# Patient Record
Sex: MALE | Race: OTHER | URBAN - METROPOLITAN AREA
[De-identification: names, ages, dates, MRNs, and addresses within clinical notes are randomized per-mention and may not be internally consistent; named-entity substitution may affect disease eponyms.]

---

## 2017-06-27 ENCOUNTER — OUTPATIENT (OUTPATIENT)
Dept: OUTPATIENT SERVICES | Facility: HOSPITAL | Age: 52
LOS: 1 days | Discharge: HOME | End: 2017-06-27

## 2017-06-28 DIAGNOSIS — K02.52 DENTAL CARIES ON PIT AND FISSURE SURFACE PENETRATING INTO DENTIN: ICD-10-CM

## 2019-08-20 ENCOUNTER — EMERGENCY (EMERGENCY)
Facility: HOSPITAL | Age: 54
LOS: 0 days | Discharge: HOME | End: 2019-08-20
Admitting: EMERGENCY MEDICINE
Payer: SUBSIDIZED

## 2019-08-20 ENCOUNTER — APPOINTMENT (OUTPATIENT)
Dept: INTERNAL MEDICINE | Facility: CLINIC | Age: 54
End: 2019-08-20
Payer: COMMERCIAL

## 2019-08-20 ENCOUNTER — OUTPATIENT (OUTPATIENT)
Dept: OUTPATIENT SERVICES | Facility: HOSPITAL | Age: 54
LOS: 1 days | Discharge: HOME | End: 2019-08-20

## 2019-08-20 VITALS
WEIGHT: 215 LBS | DIASTOLIC BLOOD PRESSURE: 81 MMHG | BODY MASS INDEX: 34.55 KG/M2 | HEIGHT: 66 IN | HEART RATE: 81 BPM | TEMPERATURE: 97.8 F | SYSTOLIC BLOOD PRESSURE: 130 MMHG

## 2019-08-20 VITALS
DIASTOLIC BLOOD PRESSURE: 69 MMHG | TEMPERATURE: 98 F | SYSTOLIC BLOOD PRESSURE: 136 MMHG | HEART RATE: 78 BPM | RESPIRATION RATE: 18 BRPM | OXYGEN SATURATION: 98 %

## 2019-08-20 DIAGNOSIS — M79.603 PAIN IN ARM, UNSPECIFIED: ICD-10-CM

## 2019-08-20 DIAGNOSIS — M79.641 PAIN IN RIGHT HAND: ICD-10-CM

## 2019-08-20 DIAGNOSIS — M79.642 PAIN IN LEFT HAND: ICD-10-CM

## 2019-08-20 PROCEDURE — 99283 EMERGENCY DEPT VISIT LOW MDM: CPT

## 2019-08-20 PROCEDURE — 99213 OFFICE O/P EST LOW 20 MIN: CPT | Mod: GC

## 2019-08-20 NOTE — ED PROVIDER NOTE - CLINICAL SUMMARY MEDICAL DECISION MAKING FREE TEXT BOX
Pt with bilateral hand pain x 3 months. States pain is throbbing and associated with numbness that is worse at night. 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 No trauma. No weakness. No decreased rom, joint swelling, redness, warmth, fever or chills. Patient given hand surgery to follow up with. Discussed strict return precautions. I have discussed the discharge plan with the patient. The patient agrees with the plan, as discussed.  The patient understands Emergency Department diagnosis is a preliminary diagnosis often based on limited information and that the patient must adhere to the follow-up plan as discussed.  The patient understands that if the symptoms worsen or if prescribed medications do not have the desired/planned effect that the patient may return to the Emergency Department at any time for further evaluation and treatment.

## 2019-08-20 NOTE — ED PROVIDER NOTE - NS ED ROS FT
Review of Systems:  	•	CONSTITUTIONAL - no fever, no diaphoresis, no chills  	•	SKIN - no rash  	•	HEMATOLOGIC - no bleeding, no bruising  	•	ENT - no congestion  	•	RESPIRATORY - no shortness of breath, no cough  	•	CARDIAC - no chest pain, no palpitations  	•	MUSCULOSKELETAL - +hand pain, no swelling, no redness  	•	NEUROLOGIC - no weakness, no headache, +paresthesias, no LOC  	All other ROS are negative except as documented in HPI.

## 2019-08-20 NOTE — ED ADULT NURSE NOTE - OBJECTIVE STATEMENT
pt with left hand pain. pt reports pain x 3 months shooting to hand. no edformity or swelling noted.

## 2019-08-20 NOTE — ED PROVIDER NOTE - CARE PROVIDER_API CALL
Som Nevarez)  Orthopaedic Surgery  [address] [place] [address]  Phone: [phone]  Fax: [phone]  Follow Up Time: 1-3 Days

## 2019-08-20 NOTE — ED PROVIDER NOTE - PHYSICAL EXAMINATION
VITAL SIGNS: I have reviewed nursing notes and confirm.  CONSTITUTIONAL: Well-developed; well-nourished; in no acute distress.  SKIN: Skin exam is warm and dry, no acute rash.  HEAD: Normocephalic; atraumatic.  EYES: PERRL, EOM intact; conjunctiva and sclera clear.  ENT: No nasal discharge; airway clear.   NECK: Supple; non tender.  CARD: S1, S2 normal; no murmurs, gallops, or rubs. Regular rate and rhythm.  RESP: Clear to auscultation bilaterally. No wheezes, rales or rhonchi.  EXT: Normal ROM. No edema.  NEURO: Alert, oriented. Grossly unremarkable. No focal deficits.  PSYCH: Cooperative, appropriate.

## 2019-08-20 NOTE — PHYSICAL EXAM
[Normal Sclera/Conjunctiva] : normal sclera/conjunctiva [No Acute Distress] : no acute distress [No JVD] : no jugular venous distention [Normal Outer Ear/Nose] : the outer ears and nose were normal in appearance [No Respiratory Distress] : no respiratory distress  [No Edema] : there was no peripheral edema [Normal Rate] : normal rate

## 2019-08-20 NOTE — ED PROVIDER NOTE - NSFOLLOWUPINSTRUCTIONS_ED_ALL_ED_FT
Carpal Tunnel Syndrome  Image   Carpal tunnel syndrome is a condition that causes pain in your hand and arm. The carpal tunnel is a narrow area located on the palm side of your wrist. Repeated wrist motion or certain diseases may cause swelling within the tunnel. This swelling pinches the main nerve in the wrist (median nerve).    What are the causes?  This condition may be caused by:  Repeated wrist motions.  Wrist injuries.  Arthritis.  A cyst or tumor in the carpal tunnel.  Fluid buildup during pregnancy.  Sometimes the cause of this condition is not known.    What increases the risk?  This condition is more likely to develop in:  People who have jobs that cause them to repeatedly move their wrists in the same motion, such as butchers and cashiers.  Women.  People with certain conditions, such as:  Diabetes.  Obesity.  An underactive thyroid (hypothyroidism).  Kidney failure.  What are the signs or symptoms?  Symptoms of this condition include:  A tingling feeling in your fingers, especially in your thumb, index, and middle fingers.  Tingling or numbness in your hand.  An aching feeling in your entire arm, especially when your wrist and elbow are bent for long periods of time.  Wrist pain that goes up your arm to your shoulder.  Pain that goes down into your palm or fingers.  A weak feeling in your hands. You may have trouble grabbing and holding items.  Your symptoms may feel worse during the night.    How is this diagnosed?  This condition is diagnosed with a medical history and physical exam. You may also have tests, including:  An electromyogram (EMG). This test measures electrical signals sent by your nerves into the muscles.  X-rays.  How is this treated?  Treatment for this condition includes:  Lifestyle changes. It is important to stop doing or modify the activity that caused your condition.  Physical or occupational therapy.  Medicines for pain and inflammation. This may include medicine that is injected into your wrist.  A wrist splint.  Surgery.  Follow these instructions at home:  If you have a splint:     Wear it as told by your health care provider. Remove it only as told by your health care provider.  Loosen the splint if your fingers become numb and tingle, or if they turn cold and blue.  Keep the splint clean and dry.  General instructions     Image   Take over-the-counter and prescription medicines only as told by your health care provider.  Rest your wrist from any activity that may be causing your pain. If your condition is work related, talk to your employer about changes that can be made, such as getting a wrist pad to use while typing.  If directed, apply ice to the painful area:  Put ice in a plastic bag.  Place a towel between your skin and the bag.  Leave the ice on for 20 minutes, 2–3 times per day.  Keep all follow-up visits as told by your health care provider. This is important.  Do any exercises as told by your health care provider, physical therapist, or occupational therapist.  Contact a health care provider if:  You have new symptoms.  Your pain is not controlled with medicines.  Your symptoms get worse.  This information is not intended to replace advice given to you by your health care provider. Make sure you discuss any questions you have with your health care provider.

## 2019-08-20 NOTE — ED PROVIDER NOTE - OBJECTIVE STATEMENT
53 yo M with no pmhx presenting with bilateral hand pain x 3 months. States pain is throbbing and associated with numbness that is worse at night. 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 No trauma. No weakness. No decreased rom, joint swelling, redness, warmth, fever or chills. No cp or sob. States had similar issue in the past but had something injected that helped with pain.

## 2019-08-20 NOTE — ED PROVIDER NOTE - NSFOLLOWUPCLINICS_GEN_ALL_ED_FT
[facility]  Orthpedic  [address], [place]   Phone: [phone]  Fax:   Follow Up Time: 1-3 Days

## 2019-08-22 NOTE — HISTORY OF PRESENT ILLNESS
[FreeTextEntry1] : Pt is a 55 yo M who comes to the clinic for left hand pain.  [de-identified] : Pt is a 55 Yo M who comes to the clinic for left hand pain. pt has a h/o left hand carpel tunnel syndrome for which he got injection in his  left hand about 10 years ago. \par now pt complains of left hand pain since 2 months. went to the ED today- was dz with carpal tunnel and referred to ortho.

## 2019-08-22 NOTE — ASSESSMENT
[FreeTextEntry1] : Pt is a 53 Yo M who comes to the clinic for left hand pain. pt has a h/o left hand carpel tunnel syndrome for which he got injection in his  left hand about 10 years ago. \par now pt complains of left hand pain since 2 months. went to the ED today- was dz with carpal tunnel and referred to ortho.\par \par #) Left hand carpel tunnel syndrome\par - Neurology referral for possible steroid injection. \par - will start on gabapentin \par \par \par \par #) HCM\par - cbc, cmp, TSH, hba1c, lipid \par - rtc in 3 months.

## 2019-09-16 ENCOUNTER — INPATIENT (INPATIENT)
Facility: HOSPITAL | Age: 54
LOS: 1 days | Discharge: HOME | End: 2019-09-18
Attending: SURGERY | Admitting: SURGERY
Payer: MEDICAID

## 2019-09-16 VITALS
RESPIRATION RATE: 20 BRPM | SYSTOLIC BLOOD PRESSURE: 147 MMHG | TEMPERATURE: 96 F | HEART RATE: 73 BPM | DIASTOLIC BLOOD PRESSURE: 91 MMHG | OXYGEN SATURATION: 100 % | WEIGHT: 220.02 LBS

## 2019-09-16 LAB
ALBUMIN SERPL ELPH-MCNC: 4.2 G/DL — SIGNIFICANT CHANGE UP (ref 3.5–5.2)
ALP SERPL-CCNC: 72 U/L — SIGNIFICANT CHANGE UP (ref 30–115)
ALT FLD-CCNC: 29 U/L — SIGNIFICANT CHANGE UP (ref 0–41)
ANION GAP SERPL CALC-SCNC: 10 MMOL/L — SIGNIFICANT CHANGE UP (ref 7–14)
APPEARANCE UR: CLEAR — SIGNIFICANT CHANGE UP
APTT BLD: 27.8 SEC — SIGNIFICANT CHANGE UP (ref 27–39.2)
AST SERPL-CCNC: 31 U/L — SIGNIFICANT CHANGE UP (ref 0–41)
BACTERIA # UR AUTO: NEGATIVE — SIGNIFICANT CHANGE UP
BASOPHILS # BLD AUTO: 0.04 K/UL — SIGNIFICANT CHANGE UP (ref 0–0.2)
BASOPHILS NFR BLD AUTO: 0.5 % — SIGNIFICANT CHANGE UP (ref 0–1)
BILIRUB SERPL-MCNC: 0.6 MG/DL — SIGNIFICANT CHANGE UP (ref 0.2–1.2)
BILIRUB UR-MCNC: NEGATIVE — SIGNIFICANT CHANGE UP
BUN SERPL-MCNC: 15 MG/DL — SIGNIFICANT CHANGE UP (ref 10–20)
CALCIUM SERPL-MCNC: 8.6 MG/DL — SIGNIFICANT CHANGE UP (ref 8.5–10.1)
CHLORIDE SERPL-SCNC: 105 MMOL/L — SIGNIFICANT CHANGE UP (ref 98–110)
CO2 SERPL-SCNC: 25 MMOL/L — SIGNIFICANT CHANGE UP (ref 17–32)
COLOR SPEC: YELLOW — SIGNIFICANT CHANGE UP
CREAT SERPL-MCNC: 0.7 MG/DL — SIGNIFICANT CHANGE UP (ref 0.7–1.5)
DIFF PNL FLD: ABNORMAL
EOSINOPHIL # BLD AUTO: 0.13 K/UL — SIGNIFICANT CHANGE UP (ref 0–0.7)
EOSINOPHIL NFR BLD AUTO: 1.5 % — SIGNIFICANT CHANGE UP (ref 0–8)
EPI CELLS # UR: 0 /HPF — SIGNIFICANT CHANGE UP (ref 0–5)
ETHANOL SERPL-MCNC: <10 MG/DL — SIGNIFICANT CHANGE UP
GLUCOSE SERPL-MCNC: 137 MG/DL — HIGH (ref 70–99)
GLUCOSE UR QL: NEGATIVE — SIGNIFICANT CHANGE UP
HCT VFR BLD CALC: 40.7 % — LOW (ref 42–52)
HGB BLD-MCNC: 13.6 G/DL — LOW (ref 14–18)
HYALINE CASTS # UR AUTO: 1 /LPF — SIGNIFICANT CHANGE UP (ref 0–7)
IMM GRANULOCYTES NFR BLD AUTO: 0.4 % — HIGH (ref 0.1–0.3)
INR BLD: 1.04 RATIO — SIGNIFICANT CHANGE UP (ref 0.65–1.3)
KETONES UR-MCNC: NEGATIVE — SIGNIFICANT CHANGE UP
LACTATE SERPL-SCNC: 1 MMOL/L — SIGNIFICANT CHANGE UP (ref 0.5–2.2)
LEUKOCYTE ESTERASE UR-ACNC: NEGATIVE — SIGNIFICANT CHANGE UP
LIDOCAIN IGE QN: 23 U/L — SIGNIFICANT CHANGE UP (ref 7–60)
LYMPHOCYTES # BLD AUTO: 1.24 K/UL — SIGNIFICANT CHANGE UP (ref 1.2–3.4)
LYMPHOCYTES # BLD AUTO: 14.5 % — LOW (ref 20.5–51.1)
MCHC RBC-ENTMCNC: 32.9 PG — HIGH (ref 27–31)
MCHC RBC-ENTMCNC: 33.4 G/DL — SIGNIFICANT CHANGE UP (ref 32–37)
MCV RBC AUTO: 98.3 FL — HIGH (ref 80–94)
MONOCYTES # BLD AUTO: 0.72 K/UL — HIGH (ref 0.1–0.6)
MONOCYTES NFR BLD AUTO: 8.4 % — SIGNIFICANT CHANGE UP (ref 1.7–9.3)
NEUTROPHILS # BLD AUTO: 6.41 K/UL — SIGNIFICANT CHANGE UP (ref 1.4–6.5)
NEUTROPHILS NFR BLD AUTO: 74.7 % — SIGNIFICANT CHANGE UP (ref 42.2–75.2)
NITRITE UR-MCNC: NEGATIVE — SIGNIFICANT CHANGE UP
NRBC # BLD: 0 /100 WBCS — SIGNIFICANT CHANGE UP (ref 0–0)
PH UR: 7.5 — SIGNIFICANT CHANGE UP (ref 5–8)
PLATELET # BLD AUTO: 168 K/UL — SIGNIFICANT CHANGE UP (ref 130–400)
POTASSIUM SERPL-MCNC: 4 MMOL/L — SIGNIFICANT CHANGE UP (ref 3.5–5)
POTASSIUM SERPL-SCNC: 4 MMOL/L — SIGNIFICANT CHANGE UP (ref 3.5–5)
PROT SERPL-MCNC: 6.7 G/DL — SIGNIFICANT CHANGE UP (ref 6–8)
PROT UR-MCNC: ABNORMAL
PROTHROM AB SERPL-ACNC: 11.9 SEC — SIGNIFICANT CHANGE UP (ref 9.95–12.87)
RBC # BLD: 4.14 M/UL — LOW (ref 4.7–6.1)
RBC # FLD: 13 % — SIGNIFICANT CHANGE UP (ref 11.5–14.5)
RBC CASTS # UR COMP ASSIST: 9 /HPF — HIGH (ref 0–4)
SODIUM SERPL-SCNC: 140 MMOL/L — SIGNIFICANT CHANGE UP (ref 135–146)
SP GR SPEC: >1.05 (ref 1.01–1.02)
TROPONIN T SERPL-MCNC: <0.01 NG/ML — SIGNIFICANT CHANGE UP
UROBILINOGEN FLD QL: SIGNIFICANT CHANGE UP
WBC # BLD: 8.57 K/UL — SIGNIFICANT CHANGE UP (ref 4.8–10.8)
WBC # FLD AUTO: 8.57 K/UL — SIGNIFICANT CHANGE UP (ref 4.8–10.8)
WBC UR QL: 0 /HPF — SIGNIFICANT CHANGE UP (ref 0–5)

## 2019-09-16 PROCEDURE — 72125 CT NECK SPINE W/O DYE: CPT | Mod: 26

## 2019-09-16 PROCEDURE — 71260 CT THORAX DX C+: CPT | Mod: 26

## 2019-09-16 PROCEDURE — 70450 CT HEAD/BRAIN W/O DYE: CPT | Mod: 26

## 2019-09-16 PROCEDURE — 99223 1ST HOSP IP/OBS HIGH 75: CPT

## 2019-09-16 PROCEDURE — 99285 EMERGENCY DEPT VISIT HI MDM: CPT

## 2019-09-16 PROCEDURE — 99222 1ST HOSP IP/OBS MODERATE 55: CPT

## 2019-09-16 PROCEDURE — 93010 ELECTROCARDIOGRAM REPORT: CPT

## 2019-09-16 PROCEDURE — 72146 MRI CHEST SPINE W/O DYE: CPT | Mod: 26

## 2019-09-16 PROCEDURE — 72170 X-RAY EXAM OF PELVIS: CPT | Mod: 26

## 2019-09-16 PROCEDURE — 72148 MRI LUMBAR SPINE W/O DYE: CPT | Mod: 26

## 2019-09-16 PROCEDURE — 71045 X-RAY EXAM CHEST 1 VIEW: CPT | Mod: 26

## 2019-09-16 PROCEDURE — 74177 CT ABD & PELVIS W/CONTRAST: CPT | Mod: 26

## 2019-09-16 RX ORDER — MORPHINE SULFATE 50 MG/1
4 CAPSULE, EXTENDED RELEASE ORAL ONCE
Refills: 0 | Status: DISCONTINUED | OUTPATIENT
Start: 2019-09-16 | End: 2019-09-16

## 2019-09-16 RX ORDER — OXYCODONE HYDROCHLORIDE 5 MG/1
5 TABLET ORAL EVERY 4 HOURS
Refills: 0 | Status: DISCONTINUED | OUTPATIENT
Start: 2019-09-16 | End: 2019-09-18

## 2019-09-16 RX ORDER — INFLUENZA VIRUS VACCINE 15; 15; 15; 15 UG/.5ML; UG/.5ML; UG/.5ML; UG/.5ML
0.5 SUSPENSION INTRAMUSCULAR ONCE
Refills: 0 | Status: DISCONTINUED | OUTPATIENT
Start: 2019-09-16 | End: 2019-09-18

## 2019-09-16 RX ORDER — ACETAMINOPHEN 500 MG
650 TABLET ORAL EVERY 6 HOURS
Refills: 0 | Status: DISCONTINUED | OUTPATIENT
Start: 2019-09-16 | End: 2019-09-18

## 2019-09-16 RX ORDER — MORPHINE SULFATE 50 MG/1
2 CAPSULE, EXTENDED RELEASE ORAL EVERY 4 HOURS
Refills: 0 | Status: DISCONTINUED | OUTPATIENT
Start: 2019-09-16 | End: 2019-09-18

## 2019-09-16 RX ORDER — HEPARIN SODIUM 5000 [USP'U]/ML
5000 INJECTION INTRAVENOUS; SUBCUTANEOUS EVERY 8 HOURS
Refills: 0 | Status: DISCONTINUED | OUTPATIENT
Start: 2019-09-16 | End: 2019-09-18

## 2019-09-16 RX ORDER — CHLORHEXIDINE GLUCONATE 213 G/1000ML
1 SOLUTION TOPICAL
Refills: 0 | Status: DISCONTINUED | OUTPATIENT
Start: 2019-09-16 | End: 2019-09-18

## 2019-09-16 RX ORDER — PANTOPRAZOLE SODIUM 20 MG/1
40 TABLET, DELAYED RELEASE ORAL
Refills: 0 | Status: DISCONTINUED | OUTPATIENT
Start: 2019-09-16 | End: 2019-09-18

## 2019-09-16 RX ORDER — SODIUM CHLORIDE 9 MG/ML
1000 INJECTION INTRAMUSCULAR; INTRAVENOUS; SUBCUTANEOUS ONCE
Refills: 0 | Status: COMPLETED | OUTPATIENT
Start: 2019-09-16 | End: 2019-09-16

## 2019-09-16 RX ORDER — KETOROLAC TROMETHAMINE 30 MG/ML
30 SYRINGE (ML) INJECTION EVERY 6 HOURS
Refills: 0 | Status: DISCONTINUED | OUTPATIENT
Start: 2019-09-16 | End: 2019-09-18

## 2019-09-16 RX ADMIN — Medication 30 MILLIGRAM(S): at 13:44

## 2019-09-16 RX ADMIN — Medication 650 MILLIGRAM(S): at 18:35

## 2019-09-16 RX ADMIN — Medication 30 MILLIGRAM(S): at 18:35

## 2019-09-16 RX ADMIN — MORPHINE SULFATE 2 MILLIGRAM(S): 50 CAPSULE, EXTENDED RELEASE ORAL at 20:19

## 2019-09-16 RX ADMIN — MORPHINE SULFATE 2 MILLIGRAM(S): 50 CAPSULE, EXTENDED RELEASE ORAL at 20:50

## 2019-09-16 RX ADMIN — SODIUM CHLORIDE 1000 MILLILITER(S): 9 INJECTION INTRAMUSCULAR; INTRAVENOUS; SUBCUTANEOUS at 08:34

## 2019-09-16 RX ADMIN — Medication 650 MILLIGRAM(S): at 13:43

## 2019-09-16 RX ADMIN — HEPARIN SODIUM 5000 UNIT(S): 5000 INJECTION INTRAVENOUS; SUBCUTANEOUS at 21:34

## 2019-09-16 RX ADMIN — MORPHINE SULFATE 4 MILLIGRAM(S): 50 CAPSULE, EXTENDED RELEASE ORAL at 09:33

## 2019-09-16 RX ADMIN — Medication 650 MILLIGRAM(S): at 18:39

## 2019-09-16 RX ADMIN — PANTOPRAZOLE SODIUM 40 MILLIGRAM(S): 20 TABLET, DELAYED RELEASE ORAL at 18:35

## 2019-09-16 RX ADMIN — Medication 30 MILLIGRAM(S): at 18:39

## 2019-09-16 NOTE — SBIRT NOTE ADULT - NSSBIRTBRIEFINTDET_GEN_A_CORE
Provided SBIRT services: Full screen positive. Referral to Treatment attempted. Screening results were reviewed with the patient and patient was provided information about healthy guidelines and potential negative consequences associated with level of risk. Motivation and readiness to reduce or stop use was discussed and goals and activities to make changes were suggested/offered. Options discussed for further evaluation and treatment, but referral to treatment was not completed because patient requires medical care and patient refused.

## 2019-09-16 NOTE — ED PROVIDER NOTE - PROGRESS NOTE DETAILS
pt signed out to me by Dr. Bush. Follow up CT scans, trauma and neurosurgical consultations, reassess and dispo. attempted to call [facility] will put an official consult. Spoke w/ neurosurgery will come and evaluate pt. Discussed with Dr. Shannon, trauma. Admit to Dr. Spear surgical floor.

## 2019-09-16 NOTE — PATIENT PROFILE ADULT - HAS THE PATIENT EXPERIENCED ANY OF THE FOLLOWING WITHIN THE WEEK PRIOR TO ADMISSION?
The supervising physician for this infusion visit is Cristal Ealm MD.  Patient arrived ambulatory using walker. Patient here with family member  
no

## 2019-09-16 NOTE — H&P ADULT - NSHPPHYSICALEXAM_GEN_ALL_CORE
PHYSICAL EXAM:  General: NAD, AAOx3, calm and cooperative  HEENT: NCAT, MARIA DEL CARMEN, EOMI, Trachea ML, Neck supple  Cardiac: RRR S1, S2, no Murmurs, rubs or gallops  Respiratory: CTAB, normal respiratory effort, breath sounds equal BL, no wheeze, rhonchi or crackles  Abdomen: Soft, non-distended, + point tenderness xyphoid, no rebound, no guarding. +BS.  Back:  + Mid back tenderness, no stepoff.   Extremities: UE: bilateral- shoulder/biceps/triceps/wrist/finger strength intact 5/5; sensory intact; no olvera  LE bilateral hip flex/leg ext/DF/PF strength intact 5/5; sensory intact ; reflexes

## 2019-09-16 NOTE — CONSULT NOTE ADULT - SUBJECTIVE AND OBJECTIVE BOX
SUSHMA GONZALEZ  [id number]      Current Issues:    HPI:       PAST MEDICAL & SURGICAL HISTORY:  No pertinent past medical history  No significant past surgical history      FAMILY HISTORY:      Allergies    No Known Allergies    Intolerances        Home Medications:      MEDICATIONS:  Antibiotics:    Neuro:    Anticoagulation:    OTHER:    IVF:      Vital Signs Last 24 Hrs  T(C): 35.7 (16 Sep 2019 07:04), Max: 35.7 (16 Sep 2019 07:04)  T(F): 96.3 (16 Sep 2019 07:04), Max: 96.3 (16 Sep 2019 07:04)  HR: 73 (16 Sep 2019 07:04) (73 - 73)  BP: 147/91 (16 Sep 2019 07:04) (147/91 - 147/91)  RR: 20 (16 Sep 2019 07:04) (20 - 20)  SpO2: 100% (16 Sep 2019 07:04) (100% - 100%)    PHYSICAL EXAM:  Constitutional:  Eyes:  face:  Back:  motors:    LABS:                        13.6   8.57  )-----------( 168      ( 16 Sep 2019 07:26 )             40.7     09-16    140  |  105  |  15  ----------------------------<  137<H>  4.0   |  25  |  0.7    Ca    8.6      16 Sep 2019 07:26    TPro  6.7  /  Alb  4.2  /  TBili  0.6  /  DBili  x   /  AST  31  /  ALT  29  /  AlkPhos  72  09-16    PT/INR - ( 16 Sep 2019 07:26 )   PT: 11.90 sec;   INR: 1.04 ratio         PTT - ( 16 Sep 2019 07:26 )  PTT:27.8 sec        RADIOLOGY & ADDITIONAL STUDIES:    < from: CT Abdomen and Pelvis w/ IV Cont (09.16.19 @ 08:16) >  IMPRESSION:  Chance-type  T11-T12 fractures -compatible with unstable injury. Further   evaluation with MRI and neurosurgical evaluation recommended.  < end of copied text >    < from: CT Cervical Spine No Cont (09.16.19 @ 08:09) >  IMPRESSION:  1.  No acute fracture demonstrated.    2.  Mild degenerative changes.  < end of copied text >    < from: CT Head No Cont (09.16.19 @ 08:07) >    IMPRESSION:    No evidence of acute intracranial hemorrhage.    < end of copied text >    Plan:              . USSHMASAMUEL GONZALEZ  [id number]      Current Issues:    Patient c/o mid lower back pain with chest/Upper abdominal pain.  worse with movements and walking.  no neck pain.  no ha.  no n, v.  no arm or leg radicular pain.  no weakness or numbness reported.  no fecal or urine incontinence  s/p fall Friday.      HPI:   54 y m no pmh pw fall. Fall on Friday while intoxicated. States he fell off of an 8 foot porch and a friend told him he landed on his stomach. Seen at [facility] but left before evaluation. Since then has been having lower back pain and epigastric pain. Denies daily etoh use. Denies blood thinner use. Denies ha, n/v, cp, sob, vision change, neck pain, extremity pain.      PAST MEDICAL & SURGICAL HISTORY:  No pertinent past medical history  No significant past surgical history      Allergies    No Known Allergies    Intolerances        Home Medications:      MEDICATIONS:  Antibiotics:    Neuro:    Anticoagulation:    OTHER:    IVF:      Vital Signs Last 24 Hrs  T(C): 35.7 (16 Sep 2019 07:04), Max: 35.7 (16 Sep 2019 07:04)  T(F): 96.3 (16 Sep 2019 07:04), Max: 96.3 (16 Sep 2019 07:04)  HR: 73 (16 Sep 2019 07:04) (73 - 73)  BP: 147/91 (16 Sep 2019 07:04) (147/91 - 147/91)  RR: 20 (16 Sep 2019 07:04) (20 - 20)  SpO2: 100% (16 Sep 2019 07:04) (100% - 100%)    PHYSICAL EXAM:  Constitutional: alert and Ox3   Eyes:  PERRLA EOMI  face:  symmtrical  Back: tender to mid back on rolling and palpation  abd: xiphoid tenderness to palp; no sensory deficits  extremities: Uppers bilateral- shoulder/biceps/triceps/wrist/finger strength intact 5/5; sensory intact; no olvera                    Lowers bilateral hip flex/leg ext/DF/PF strength intact 5/5; sensory intact ; reflexes 2+    LABS:                        13.6   8.57  )-----------( 168      ( 16 Sep 2019 07:26 )             40.7     09-16    140  |  105  |  15  ----------------------------<  137<H>  4.0   |  25  |  0.7    Ca    8.6      16 Sep 2019 07:26    TPro  6.7  /  Alb  4.2  /  TBili  0.6  /  DBili  x   /  AST  31  /  ALT  29  /  AlkPhos  72  09-16    PT/INR - ( 16 Sep 2019 07:26 )   PT: 11.90 sec;   INR: 1.04 ratio         PTT - ( 16 Sep 2019 07:26 )  PTT:27.8 sec        RADIOLOGY & ADDITIONAL STUDIES:    < from: CT Abdomen and Pelvis w/ IV Cont (09.16.19 @ 08:16) >  IMPRESSION:  Chance-type  T11-T12 fractures -compatible with unstable injury. Further   evaluation with MRI and neurosurgical evaluation recommended.  < end of copied text >    < from: CT Cervical Spine No Cont (09.16.19 @ 08:09) >  IMPRESSION:  1.  No acute fracture demonstrated.    2.  Mild degenerative changes.  < end of copied text >    < from: CT Head No Cont (09.16.19 @ 08:07) >    IMPRESSION:    No evidence of acute intracranial hemorrhage.    < end of copied text >    Plan:    strict bedrest  TLSO brace   MRI T-LS spine without contrast  Discussed with Attending Neurosurgeon the  patient status and agreed with the current plan.           .

## 2019-09-16 NOTE — ED ADULT NURSE NOTE - NSIMPLEMENTINTERV_GEN_ALL_ED
Implemented All Fall Risk Interventions:  [place] to call system. Call bell, personal items and telephone within reach. Instruct patient to call for assistance. Room bathroom lighting operational. Non-slip footwear when patient is off stretcher. Physically safe environment: no spills, clutter or unnecessary equipment. Stretcher in lowest position, wheels locked, appropriate side rails in place. Provide visual cue, wrist band, yellow gown, etc. Monitor gait and stability. Monitor for mental status changes and reorient to person, place, and time. Review medications for side effects contributing to fall risk. Reinforce activity limits and safety measures with patient and family.

## 2019-09-16 NOTE — CONSULT NOTE ADULT - ATTENDING COMMENTS
Pt seen and examined with PA. Pt s/p fall while intoxicated on friday, presents today to ER, ambulating, c/o of severe back pain. found to have Chance-type T11-T12 fractures -compatible with unstable injury. Pt with no LE complaints. BLE 5/5, B/B function intact. Recc bed rest for now. Obtain TLSO brace, MRI of the lumbar spine. Pending MRI results will determine if can manage fracture expectantly with TLSO brace or if patient will require fusion stabilization. Start [facility].

## 2019-09-16 NOTE — ED PROVIDER NOTE - PHYSICAL EXAMINATION
CONSTITUTIONAL: Well-developed; well-nourished; in no acute distress.   SKIN: warm, dry; no laceration, abrasion, ecchymoses.   HEAD: Normocephalic; atraumatic.  EYES: PERRL, EOMI, normal sclera and conjunctiva   ENT: No nasal discharge; airway clear.  NECK: Supple; non tender.  CARD: S1, S2 normal; no murmurs, gallops, or rubs. Regular rate and rhythm.   RESP: No wheezes, rales or rhonchi.  ABD: soft, nondistended. TTP over epigastrium to light palpation. No ecchymoses over abdominal wall.   EXT: Lumbar paraspinal back tenderness, worse L>R. No midline spinal tenderness. Ambulating and bearing weight. No point tenderness or deformity over extremities. Normal ROM.  No clubbing, cyanosis or edema.   LYMPH: No acute cervical adenopathy.  NEURO: Alert, oriented, grossly unremarkable. GCS 15. Moving all extremities with normal strength/sensation.  PSYCH: Cooperative, appropriate.

## 2019-09-16 NOTE — H&P ADULT - NSHPLABSRESULTS_GEN_ALL_CORE
LAB/STUDIES:             13.6   8.57  )-----------( 168      ( 16 Sep 2019 07:26 )             40.7     09-16    140  |  105  |  15  ----------------------------<  137<H>  4.0   |  25  |  0.7    Ca    8.6      16 Sep 2019 07:26    TPro  6.7  /  Alb  4.2  /  TBili  0.6  /  DBili  x   /  AST  31  /  ALT  29  /  AlkPhos  72  09-16    PT/INR - ( 16 Sep 2019 07:26 )   PT: 11.90 sec;   INR: 1.04 ratio      PTT - ( 16 Sep 2019 07:26 )  PTT:27.8 sec  LIVER FUNCTIONS - ( 16 Sep 2019 07:26 )  Alb: 4.2 g/dL / Pro: 6.7 g/dL / ALK PHOS: 72 U/L / ALT: 29 U/L / AST: 31 U/L / GGT: x           Urinalysis Basic - ( 16 Sep 2019 07:26 )    Color: Yellow / Appearance: Clear / SG: >1.050 / pH: x  Gluc: x / Ketone: Negative  / Bili: Negative / Urobili: <2 mg/dL   Blood: x / Protein: 30 mg/dL / Nitrite: Negative   Leuk Esterase: Negative / RBC: 9 /HPF / WBC 0 /HPF   Sq Epi: x / Non Sq Epi: 0 /HPF / Bacteria: Negative    CARDIAC MARKERS ( 16 Sep 2019 07:32 )  x     / <0.01 ng/mL / x     / x     / x        IMAGING:  CT Head No Cont (09.16.19 @ 08:07) >  IMPRESSION:  No evidence of acute intracranial hemorrhage.    CT Cervical Spine No Cont (09.16.19 @ 08:09) >  IMPRESSION:  1.  No acute fracture demonstrated.  2.  Mild degenerative change    CT Chest w/ IV Cont (09.16.19 @ 08:16) >  IMPRESSION:  Chance-type  T11-T12 fractures -compatible with unstable injury. Further evaluation with MRI and neurosurgical evaluation recommended.    CT Abdomen and Pelvis w/ IV Cont (09.16.19 @ 08:16) >  IMPRESSION:  Chance-type  T11-T12 fractures -compatible with unstable injury. Further evaluation with MRI and neurosurgical evaluation recommended.

## 2019-09-16 NOTE — CONSULT NOTE ADULT - CONSULT REQUESTED DATE/TIME
Rafael called to report that he is starting 1cup of Kale, and 2cups of spinach as he is having a hard time eating 2cups of kale a day. I thanked him for calling, and will update chart for records.    16-Sep-2019 09:40

## 2019-09-16 NOTE — ED ADULT TRIAGE NOTE - CHIEF COMPLAINT QUOTE
pt states he fell off his porch on Friday night while intoxicated (approx 8 feet)  pt now c.o. back pain and rib pain. pt denies LOC/anticoagulation/head trauma

## 2019-09-16 NOTE — ED PROVIDER NOTE - CARE PLAN
Principal Discharge DX:	Fall  Secondary Diagnosis:	T11 vertebral fracture  Secondary Diagnosis:	T12 vertebral fracture

## 2019-09-16 NOTE — H&P ADULT - ASSESSMENT
54M w/ no PMHx s/p fall on Friday 9/13 while intoxicated. Pt  fell off of an 8ft porch and a friend told him he landed on his stomach. Seen at [facility] but left before evaluation. Since then has been having lower back pain and epigastric pain. Trauma work up in ED with findings of abhinav - type  T11-T12 fractures -compatible with unstable injury. On Exam no neuro deficits. + Tspine tenderness.    PLAN:  - Evaluated by NSx recs: strict bedrest, TLSO brace , MRI T-LS spine without contrast.  - Regular diet, IVL  - Pain Control  - GI Prophylaxis w/ Protonix 40mg Daily   - Chemoprophylaxis w/ Heparin SubQ 5000u q8h.

## 2019-09-16 NOTE — H&P ADULT - HISTORY OF PRESENT ILLNESS
54M w/ no PMHx s/p fall on Friday 9/13 while intoxicated. Pt  fell off of an 8ft porch and a friend told him he landed on his stomach. Seen at [facility] but left before evaluation. Since then has been having lower back pain and epigastric pain. Denies daily Etoh use. Denies blood thinner use.

## 2019-09-16 NOTE — H&P ADULT - ATTENDING COMMENTS
54M w/ no PMHx s/p fall on Friday 9/13 while intoxicated. Pt  fell off of an 8ft porch and a friend told him he landed on his stomach. Seen at [facility] but left before evaluation. Since then has been having lower back pain and epigastric pain. Trauma work up in ED with findings of abhinav - type  T11-T12 fractures -compatible with unstable injury. On Exam no neuro deficits. + Tspine tenderness.    PLAN:  - Evaluated by NSx recs: strict bedrest, TLSO brace , MRI T-LS spine without contrast.  - Regular diet, IVL  - Pain Control  - GI Prophylaxis w/ Protonix 40mg Daily   - Chemoprophylaxis w/ Heparin SubQ 5000u q8h.

## 2019-09-16 NOTE — ED PROVIDER NOTE - NS ED ROS FT
Eyes:  No visual changes, eye pain or discharge.  ENMT:  No hearing changes, pain, discharge or infections. No neck pain or stiffness.  Cardiac:  No chest pain, SOB or edema. No chest pain with exertion.  Respiratory:  No cough or respiratory distress. No hemoptysis. No history of asthma or RAD.  GI:  +epigastric pain. No nausea, vomiting, diarrhea   :  No dysuria, frequency or burning.  MS: +back pain. No myalgia, muscle weakness, joint pain  Neuro:  No headache or weakness.  No LOC.  Skin:  No skin rash, laceration, abrasion, ecchymoses.  Endocrine: No history of thyroid disease or diabetes.

## 2019-09-16 NOTE — ED PROVIDER NOTE - ATTENDING CONTRIBUTION TO CARE
53 y/o male denies sig PMH / PSH presents with MSCP x 2 days. Pt states that he was drinking 2 days ago and fell from a height of approx 8 feet, he does not recall the event but it was witnessed by his friends, since that day he has had the MSCP, constant, radiates to his back, worse with certain movements position, deep breath, better with rest, denies head trauma, LOC, paresthesias or other complaints at present. He was not previously evaluated for this fall.    Old chart reviewed.  I have reviewed and agree with the nursing note, except as documented in my note.    VSS, awake, alert, non-toxic appearing, sitting comfortably on exam table, in NAD, Head NC / NT, PERRL / EOMI, no hemotympanum, no dental injury, no abrasions or lacerations, chest CTAB, slower sternum / left chest wall ttp w/o crepitus or flail, no w/r/r, +S1/S2, RRR, no m/r/g, abdomen soft, NT, ND, +BS, able to voluntarily actively rotate neck 45 degrees left and right on request, no midline spinal tenderness, no CVA tenderness, FROM x 4, no bony tenderness, alert and oriented to person, place and time, clear speech, cranial nerves II-XII are intact, upper and lower extremity strength is 5/5, symmetrical against gravity and external force, sensation is intact, coordination and gait are normal. GCS 15.

## 2019-09-16 NOTE — ED PROVIDER NOTE - CLINICAL SUMMARY MEDICAL DECISION MAKING FREE TEXT BOX
53 Y/O M S/P FALL FROM HEIGHT OF 8 FEET 2 DAYS PRIOR TO PRESENTATION. PT C/O BACK PAIN. ALL DIAGNOSTIC TESTING REVIEWED. CT SCANS WITH MULTIPLE LUMBAR SPINE FXS. TRAUMA CONSULTED. NEUROSURGERY CONSULTED. NEURO EXAM NONFOCAL. PT ADMITTED TO THE TRAUMA SERVICE.

## 2019-09-17 LAB
ANION GAP SERPL CALC-SCNC: 10 MMOL/L — SIGNIFICANT CHANGE UP (ref 7–14)
ANION GAP SERPL CALC-SCNC: 9 MMOL/L — SIGNIFICANT CHANGE UP (ref 7–14)
BASOPHILS # BLD AUTO: 0.03 K/UL — SIGNIFICANT CHANGE UP (ref 0–0.2)
BASOPHILS NFR BLD AUTO: 0.5 % — SIGNIFICANT CHANGE UP (ref 0–1)
BUN SERPL-MCNC: 14 MG/DL — SIGNIFICANT CHANGE UP (ref 10–20)
BUN SERPL-MCNC: 16 MG/DL — SIGNIFICANT CHANGE UP (ref 10–20)
CALCIUM SERPL-MCNC: 8.3 MG/DL — LOW (ref 8.5–10.1)
CALCIUM SERPL-MCNC: 8.8 MG/DL — SIGNIFICANT CHANGE UP (ref 8.5–10.1)
CHLORIDE SERPL-SCNC: 105 MMOL/L — SIGNIFICANT CHANGE UP (ref 98–110)
CHLORIDE SERPL-SCNC: 106 MMOL/L — SIGNIFICANT CHANGE UP (ref 98–110)
CO2 SERPL-SCNC: 23 MMOL/L — SIGNIFICANT CHANGE UP (ref 17–32)
CO2 SERPL-SCNC: 25 MMOL/L — SIGNIFICANT CHANGE UP (ref 17–32)
CREAT SERPL-MCNC: 0.6 MG/DL — LOW (ref 0.7–1.5)
CREAT SERPL-MCNC: 0.8 MG/DL — SIGNIFICANT CHANGE UP (ref 0.7–1.5)
EOSINOPHIL # BLD AUTO: 0.22 K/UL — SIGNIFICANT CHANGE UP (ref 0–0.7)
EOSINOPHIL NFR BLD AUTO: 3.3 % — SIGNIFICANT CHANGE UP (ref 0–8)
GLUCOSE SERPL-MCNC: 119 MG/DL — HIGH (ref 70–99)
GLUCOSE SERPL-MCNC: 126 MG/DL — HIGH (ref 70–99)
HCT VFR BLD CALC: 37.8 % — LOW (ref 42–52)
HCT VFR BLD CALC: 38.8 % — LOW (ref 42–52)
HCV AB S/CO SERPL IA: 0.18 S/CO — SIGNIFICANT CHANGE UP (ref 0–0.99)
HCV AB SERPL-IMP: SIGNIFICANT CHANGE UP
HGB BLD-MCNC: 12.5 G/DL — LOW (ref 14–18)
HGB BLD-MCNC: 13 G/DL — LOW (ref 14–18)
IMM GRANULOCYTES NFR BLD AUTO: 0.6 % — HIGH (ref 0.1–0.3)
LYMPHOCYTES # BLD AUTO: 1.93 K/UL — SIGNIFICANT CHANGE UP (ref 1.2–3.4)
LYMPHOCYTES # BLD AUTO: 29.1 % — SIGNIFICANT CHANGE UP (ref 20.5–51.1)
MAGNESIUM SERPL-MCNC: 2.1 MG/DL — SIGNIFICANT CHANGE UP (ref 1.8–2.4)
MAGNESIUM SERPL-MCNC: 2.2 MG/DL — SIGNIFICANT CHANGE UP (ref 1.8–2.4)
MCHC RBC-ENTMCNC: 32.3 PG — HIGH (ref 27–31)
MCHC RBC-ENTMCNC: 32.7 PG — HIGH (ref 27–31)
MCHC RBC-ENTMCNC: 33.1 G/DL — SIGNIFICANT CHANGE UP (ref 32–37)
MCHC RBC-ENTMCNC: 33.5 G/DL — SIGNIFICANT CHANGE UP (ref 32–37)
MCV RBC AUTO: 97.5 FL — HIGH (ref 80–94)
MCV RBC AUTO: 97.7 FL — HIGH (ref 80–94)
MONOCYTES # BLD AUTO: 0.52 K/UL — SIGNIFICANT CHANGE UP (ref 0.1–0.6)
MONOCYTES NFR BLD AUTO: 7.8 % — SIGNIFICANT CHANGE UP (ref 1.7–9.3)
NEUTROPHILS # BLD AUTO: 3.89 K/UL — SIGNIFICANT CHANGE UP (ref 1.4–6.5)
NEUTROPHILS NFR BLD AUTO: 58.7 % — SIGNIFICANT CHANGE UP (ref 42.2–75.2)
NRBC # BLD: 0 /100 WBCS — SIGNIFICANT CHANGE UP (ref 0–0)
NRBC # BLD: 0 /100 WBCS — SIGNIFICANT CHANGE UP (ref 0–0)
PHOSPHATE SERPL-MCNC: 3.5 MG/DL — SIGNIFICANT CHANGE UP (ref 2.1–4.9)
PHOSPHATE SERPL-MCNC: 3.7 MG/DL — SIGNIFICANT CHANGE UP (ref 2.1–4.9)
PLATELET # BLD AUTO: 152 K/UL — SIGNIFICANT CHANGE UP (ref 130–400)
PLATELET # BLD AUTO: 167 K/UL — SIGNIFICANT CHANGE UP (ref 130–400)
POTASSIUM SERPL-MCNC: 4.2 MMOL/L — SIGNIFICANT CHANGE UP (ref 3.5–5)
POTASSIUM SERPL-MCNC: 4.2 MMOL/L — SIGNIFICANT CHANGE UP (ref 3.5–5)
POTASSIUM SERPL-SCNC: 4.2 MMOL/L — SIGNIFICANT CHANGE UP (ref 3.5–5)
POTASSIUM SERPL-SCNC: 4.2 MMOL/L — SIGNIFICANT CHANGE UP (ref 3.5–5)
RBC # BLD: 3.87 M/UL — LOW (ref 4.7–6.1)
RBC # BLD: 3.98 M/UL — LOW (ref 4.7–6.1)
RBC # FLD: 12.8 % — SIGNIFICANT CHANGE UP (ref 11.5–14.5)
RBC # FLD: 12.8 % — SIGNIFICANT CHANGE UP (ref 11.5–14.5)
SODIUM SERPL-SCNC: 138 MMOL/L — SIGNIFICANT CHANGE UP (ref 135–146)
SODIUM SERPL-SCNC: 140 MMOL/L — SIGNIFICANT CHANGE UP (ref 135–146)
WBC # BLD: 5.74 K/UL — SIGNIFICANT CHANGE UP (ref 4.8–10.8)
WBC # BLD: 6.63 K/UL — SIGNIFICANT CHANGE UP (ref 4.8–10.8)
WBC # FLD AUTO: 5.74 K/UL — SIGNIFICANT CHANGE UP (ref 4.8–10.8)
WBC # FLD AUTO: 6.63 K/UL — SIGNIFICANT CHANGE UP (ref 4.8–10.8)

## 2019-09-17 PROCEDURE — 99232 SBSQ HOSP IP/OBS MODERATE 35: CPT

## 2019-09-17 RX ADMIN — Medication 650 MILLIGRAM(S): at 17:40

## 2019-09-17 RX ADMIN — Medication 30 MILLIGRAM(S): at 17:41

## 2019-09-17 RX ADMIN — Medication 650 MILLIGRAM(S): at 07:00

## 2019-09-17 RX ADMIN — Medication 650 MILLIGRAM(S): at 11:57

## 2019-09-17 RX ADMIN — Medication 30 MILLIGRAM(S): at 01:15

## 2019-09-17 RX ADMIN — OXYCODONE HYDROCHLORIDE 5 MILLIGRAM(S): 5 TABLET ORAL at 21:31

## 2019-09-17 RX ADMIN — Medication 30 MILLIGRAM(S): at 06:39

## 2019-09-17 RX ADMIN — Medication 650 MILLIGRAM(S): at 17:41

## 2019-09-17 RX ADMIN — Medication 650 MILLIGRAM(S): at 06:39

## 2019-09-17 RX ADMIN — Medication 650 MILLIGRAM(S): at 23:43

## 2019-09-17 RX ADMIN — Medication 30 MILLIGRAM(S): at 11:59

## 2019-09-17 RX ADMIN — Medication 30 MILLIGRAM(S): at 00:22

## 2019-09-17 RX ADMIN — HEPARIN SODIUM 5000 UNIT(S): 5000 INJECTION INTRAVENOUS; SUBCUTANEOUS at 14:38

## 2019-09-17 RX ADMIN — Medication 30 MILLIGRAM(S): at 23:43

## 2019-09-17 RX ADMIN — HEPARIN SODIUM 5000 UNIT(S): 5000 INJECTION INTRAVENOUS; SUBCUTANEOUS at 21:31

## 2019-09-17 RX ADMIN — Medication 650 MILLIGRAM(S): at 11:59

## 2019-09-17 RX ADMIN — HEPARIN SODIUM 5000 UNIT(S): 5000 INJECTION INTRAVENOUS; SUBCUTANEOUS at 06:39

## 2019-09-17 RX ADMIN — Medication 650 MILLIGRAM(S): at 00:22

## 2019-09-17 RX ADMIN — PANTOPRAZOLE SODIUM 40 MILLIGRAM(S): 20 TABLET, DELAYED RELEASE ORAL at 06:40

## 2019-09-17 RX ADMIN — Medication 650 MILLIGRAM(S): at 01:15

## 2019-09-17 RX ADMIN — Medication 30 MILLIGRAM(S): at 07:00

## 2019-09-17 NOTE — PROGRESS NOTE ADULT - ASSESSMENT
Assessment:  54M w/ no PMHx s/p fall on Friday 9/13 while intoxicated. Pt  fell off of an 8ft porch and a friend told him he landed on his stomach. Seen at [facility] but left before evaluation. Since then has been having lower back pain and epigastric pain. Trauma work up in ED with findings of abhinav - type  T11-T12 fractures -compatible with unstable injury. On Exam no neuro deficits. + Tspine tenderness. No other injuries.    PLAN:  - Evaluated by NSx recs: strict bedrest, TLSO brace , MRI T-LS spine without contrast.  - Regular diet, IVL  - Pain Control  - GI Prophylaxis w/ Protonix 40mg Daily   - Chemoprophylaxis w/ Heparin SubQ 5000u q8h.

## 2019-09-17 NOTE — CHART NOTE - NSCHARTNOTEFT_GEN_A_CORE
Spoke to patient with  phone (ID#[phone]). Patient reports back pain is controlled with pain medications. Patient was told that we are working on getting the TLSO brace and until he has the brace he needs to be bedrest. He verbalized understanding. All the questions answered at this time

## 2019-09-17 NOTE — PROGRESS NOTE ADULT - SUBJECTIVE AND OBJECTIVE BOX
GENERAL SURGERY PROGRESS NOTE     SUSHMA GONZALEZ  54y  Male  Hospital day :1d    OVERNIGHT EVENTS: No acute events overnight.    T(F): 97.2 (09-17-19 @ 01:22), Max: 98 (09-16-19 @ 13:57)  HR: 70 (09-17-19 @ 01:22) (61 - 78)  BP: 145/67 (09-17-19 @ 01:22) (128/57 - 147/91)  RR: 18 (09-17-19 @ 01:22) (18 - 20)  SpO2: 99% (09-16-19 @ 13:57) (98% - 100%)     GI proph:  pantoprazole    Tablet 40 milliGRAM(s) Oral before breakfast    AC/ proph: heparin  Injectable 5000 Unit(s) SubCutaneous every 8 hours    PHYSICAL EXAM:  GENERAL: NAD, well-appearing  CHEST/LUNG: Clear to auscultation bilaterally  HEART: Regular rate and rhythm  ABDOMEN: Soft, Nontender, Nondistended;   EXTREMITIES:  No clubbing, cyanosis, or edema    LABS  Labs:  CAPILLARY BLOOD GLUCOSE                  13.6   8.57  )-----------( 168      ( 16 Sep 2019 07:26 )             40.7       Auto Immature Granulocyte %: 0.4 % (09-16-19 @ 07:26)  Auto Neutrophil %: 74.7 % (09-16-19 @ 07:26)    09-16    140  |  105  |  15  ----------------------------<  137<H>  4.0   |  25  |  0.7      Calcium, Total Serum: 8.6 mg/dL (09-16-19 @ 07:26)      LFTs:             6.7  | 0.6  | 31       ------------------[72      ( 16 Sep 2019 07:26 )  4.2  | x    | 29          Lipase:23     Amylase:x         Lactate, Blood: 1.0 mmol/L (09-16-19 @ 07:26)      Coags:     11.90  ----< 1.04    ( 16 Sep 2019 07:26 )     27.8        CARDIAC MARKERS ( 16 Sep 2019 07:32 )  x     / <0.01 ng/mL / x     / x     / x            Alcohol, Blood: <10 mg/dL (09-16-19 @ 07:26)    Urinalysis Basic - ( 16 Sep 2019 07:26 )    Color: Yellow / Appearance: Clear / SG: >1.050 / pH: x  Gluc: x / Ketone: Negative  / Bili: Negative / Urobili: <2 mg/dL   Blood: x / Protein: 30 mg/dL / Nitrite: Negative   Leuk Esterase: Negative / RBC: 9 /HPF / WBC 0 /HPF   Sq Epi: x / Non Sq Epi: 0 /HPF / Bacteria: Negative    RADIOLOGY & ADDITIONAL TESTS:  no new studies

## 2019-09-18 ENCOUNTER — TRANSCRIPTION ENCOUNTER (OUTPATIENT)
Age: 54
End: 2019-09-18

## 2019-09-18 VITALS
DIASTOLIC BLOOD PRESSURE: 56 MMHG | TEMPERATURE: 97 F | RESPIRATION RATE: 18 BRPM | HEART RATE: 66 BPM | SYSTOLIC BLOOD PRESSURE: 117 MMHG

## 2019-09-18 PROCEDURE — 99238 HOSP IP/OBS DSCHRG MGMT 30/<: CPT

## 2019-09-18 RX ORDER — IBUPROFEN 200 MG
600 TABLET ORAL EVERY 6 HOURS
Refills: 0 | Status: DISCONTINUED | OUTPATIENT
Start: 2019-09-18 | End: 2019-09-18

## 2019-09-18 RX ORDER — ACETAMINOPHEN 500 MG
2 TABLET ORAL
Qty: 0 | Refills: 0 | DISCHARGE
Start: 2019-09-18

## 2019-09-18 RX ORDER — IBUPROFEN 200 MG
1 TABLET ORAL
Qty: 0 | Refills: 0 | DISCHARGE
Start: 2019-09-18

## 2019-09-18 RX ORDER — OXYCODONE HYDROCHLORIDE 5 MG/1
1 TABLET ORAL
Qty: 10 | Refills: 0
Start: 2019-09-18 | End: 2019-09-20

## 2019-09-18 RX ADMIN — PANTOPRAZOLE SODIUM 40 MILLIGRAM(S): 20 TABLET, DELAYED RELEASE ORAL at 06:17

## 2019-09-18 RX ADMIN — Medication 600 MILLIGRAM(S): at 11:12

## 2019-09-18 RX ADMIN — Medication 30 MILLIGRAM(S): at 07:00

## 2019-09-18 RX ADMIN — Medication 650 MILLIGRAM(S): at 14:12

## 2019-09-18 RX ADMIN — Medication 650 MILLIGRAM(S): at 00:30

## 2019-09-18 RX ADMIN — Medication 650 MILLIGRAM(S): at 07:00

## 2019-09-18 RX ADMIN — Medication 650 MILLIGRAM(S): at 06:17

## 2019-09-18 RX ADMIN — HEPARIN SODIUM 5000 UNIT(S): 5000 INJECTION INTRAVENOUS; SUBCUTANEOUS at 06:16

## 2019-09-18 RX ADMIN — HEPARIN SODIUM 5000 UNIT(S): 5000 INJECTION INTRAVENOUS; SUBCUTANEOUS at 14:12

## 2019-09-18 RX ADMIN — Medication 30 MILLIGRAM(S): at 06:16

## 2019-09-18 RX ADMIN — OXYCODONE HYDROCHLORIDE 5 MILLIGRAM(S): 5 TABLET ORAL at 14:14

## 2019-09-18 RX ADMIN — Medication 30 MILLIGRAM(S): at 00:30

## 2019-09-18 NOTE — PROGRESS NOTE ADULT - REASON FOR ADMISSION
s/p fall 3 days ago, chance-type  T11-T12 fractures

## 2019-09-18 NOTE — PROGRESS NOTE ADULT - ASSESSMENT
Assessment:  54M w/ no PMHx s/p fall on Friday 9/13 while intoxicated. Pt  fell off of an 8ft porch and a friend told him he landed on his stomach. Seen at [facility] but left before evaluation. Since then has been having lower back pain and epigastric pain. Trauma work up in ED with findings of abhinav - type  T11-T12 fractures -compatible with unstable injury. On Exam no neuro deficits. + Tspine tenderness. No other injuries.    PLAN:  - TLSO given to patient  - Patient can ambulate with TLSO brace on per neurosurgery  - Regular diet, IVL  - Pain Control  - GI Prophylaxis w/ Protonix 40mg Daily   - Chemoprophylaxis w/ Heparin SubQ 5000u q8h.

## 2019-09-18 NOTE — DISCHARGE NOTE PROVIDER - NS AS DC PROVIDER CONTACT Y/N MULTI
Chief Complaint   Patient presents with   • Tinnitus     Ear buzzing   • Dizziness     Head feels stuffy X 3 weeks       Subjective   Kathy Asher is a 62 y.o. female    Sinus Problem   This is a new problem. Episode onset: 3 wks. The problem is unchanged. There has been no fever. Pain scale: full, stuffy, heavy. She is experiencing no pain. Associated symptoms include congestion, coughing (dry, occasionally), ear pain (left ear buzzing), a hoarse voice, sinus pressure and swollen glands (sometimes). Pertinent negatives include no chills, diaphoresis, headaches, neck pain or shortness of breath.   With some of balance feeling. Had chronic ethmoid sinusitis on MRI of brain. Pt taking Zyrtec, Zyrtec D, Flonase, Astepro, Singulair.   Went to ER at [facility] 10/9/17 ans CXR showed possible right upper lobe mass vs pneumonia-reviewed. Pt saw [facility] 10/13/17, and they opted to repeat CXR in 6 wks.Note reviewed, Still with some wheezing at times, raheel at night. Saw allergist in beginning of summer. Has seen Dr Thomson All[facility]ist in past.      Allergies   Allergen Reactions   • Ciprofloxacin    • Etodolac      Past Medical History:   Diagnosis Date   • Abnormal finding on lung imaging     CT scan of the chest 06/04/2014 reports interval improvement in the right perihilar masslike opacity and stability in the left perihilar opacity, pulmonary nodules and lymphadenopathy.   • Abnormal Pap smear of cervix    • Allergic rhinitis    • Asthma    • Cervical dysplasia     CIN2   • History of chest x-ray 08/27/2015    interval increase in patchy opacity within the right upper lobe compared to prior study; previously described left mid lung opacity appears similar to the prior study; dextrocurvature of thoracic spine with mild degenerative changes of the spine    • History of chest x-ray 07/09/2012    extensive, masslike and infiltrating disease of the right upper lobe and mild left perihilar disease   • History of chest x-ray  11/17/2010    chest is hyperinflated; infiltrate in right upper lobe apical segment with some associated adenopathy and old granulomatous changes    • History of histoplasmosis    • History of PFTs 06/06/2014    data is acceptable and reproducible; pt given 3 puffs of albuterol; pt gave good effort    • History of PFTs 08/12/2013    data is acceptable and reproducible; pt given 3 puffs of albuterol; pt gave good effort    • History of PFTs 01/24/2011    pt gave good effort; spirometry is acceptable and reproducible;    • IBS (irritable bowel syndrome)    • Sarcoidosis     Diagnosed by lymph node biopsy in 2000.  She has well preserved spirometry.   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   • Vulvar dysplasia     VIN3      Past Surgical History:   Procedure Laterality Date   • BREAST BIOPSY     • COLONOSCOPY     • DIAGNOSTIC LAPAROSCOPY EXPLORATORY LAPAROTOMY     • EXCISION LESION  06/27/2016    vaginal WLE   • LYMPH NODE BIOPSY     • MOUTH SURGERY       Social History     Social History   • Marital status: Single     Spouse name: N/A   • Number of children: N/A   • Years of education: N/A     Occupational History   • retired       Social History Main Topics   • Smoking status: Never Smoker   • Smokeless tobacco: Never Used   • Alcohol use No   • Drug use: No   • Sexual activity: Defer      Comment: single     Other Topics Concern   • Not on file     Social History Narrative        Current Outpatient Prescriptions:   •  albuterol (PROAIR HFA) 108 (90 BASE) MCG/ACT inhaler, Inhale 2 puffs every 4 (four) hours as needed for wheezing., Disp: 1 inhaler, Rfl: 11  •  Ascorbic Acid (VITAMIN C) 500 MG capsule, Take  by mouth., Disp: , Rfl:   •  azelastine (ASTELIN) 0.1 % nasal spray, U 2 SPRAYS IEN BID PRF ALLERGIES, Disp: , Rfl: 11  •  azelastine (ASTEPRO) 0.15 % solution nasal spray, into each nostril., Disp: , Rfl:   •  cetirizine (ZyrTEC) 10 MG tablet, Take  by mouth., Disp: , Rfl:   •  cetirizine-pseudoephedrine  (ZYRTEC-D ALLERGY & CONGESTION) 5-120 MG per 12 hr tablet, Take  by mouth., Disp: , Rfl:   •  fluticasone (FLONASE) 50 MCG/ACT nasal spray, into each nostril 2 (two) times a day., Disp: , Rfl:   •  latanoprost (XALATAN) 0.005 % ophthalmic solution, Apply  to eye., Disp: , Rfl:   •  montelukast (SINGULAIR) 10 MG tablet, TK 1 T PO IN THE DEREK, Disp: , Rfl: 11  •  Multiple Minerals-Vitamins (ADVANCED CALCIUM/D/MAGNESIUM) tablet, Take  by mouth., Disp: , Rfl:   •  Multiple Vitamin (MULTI-VITAMIN) tablet, Take  by mouth., Disp: , Rfl:   •  Vitamin E 400 UNITS tablet, Take  by mouth., Disp: , Rfl:      Review of Systems   Constitutional: Negative for chills and diaphoresis.   HENT: Positive for congestion, ear pain (left ear buzzing), hoarse voice and sinus pressure.    Respiratory: Positive for cough (dry, occasionally). Negative for shortness of breath.    Gastrointestinal: Negative for constipation, diarrhea, nausea and vomiting.   Genitourinary: Negative for difficulty urinating and dysuria.   Musculoskeletal: Negative for neck pain.   Neurological: Negative for headaches.   Psychiatric/Behavioral: Negative for sleep disturbance. The patient is not nervous/anxious.        Objective     Vitals:    11/03/17 1211   BP: 130/88   Pulse: 80   Temp: 96.9 °F (36.1 °C)   SpO2: 99%       Results for orders placed or performed in visit on 06/29/17   Comprehensive Metabolic Panel   Result Value Ref Range    Glucose 81 70 - 100 mg/dL    BUN 15 9 - 23 mg/dL    Creatinine 0.90 0.60 - 1.30 mg/dL    Sodium 139 132 - 146 mmol/L    Potassium 4.1 3.5 - 5.5 mmol/L    Chloride 101 99 - 109 mmol/L    CO2 29.0 20.0 - 31.0 mmol/L    Calcium 10.3 8.7 - 10.4 mg/dL    Total Protein 7.7 5.7 - 8.2 g/dL    Albumin 4.30 3.20 - 4.80 g/dL    ALT (SGPT) 20 7 - 40 U/L    AST (SGOT) 22 0 - 33 U/L    Alkaline Phosphatase 58 25 - 100 U/L    Total Bilirubin 0.5 0.3 - 1.2 mg/dL    eGFR  African Amer 77 >60 mL/min/1.73    Globulin 3.4 gm/dL    A/G Ratio 1.3  (L) 1.5 - 2.5 g/dL    BUN/Creatinine Ratio 16.7 7.0 - 25.0    Anion Gap 9.0 3.0 - 11.0 mmol/L   CBC (No Diff)   Result Value Ref Range    WBC 5.98 3.50 - 10.80 10*3/mm3    RBC 6.32 (H) 3.89 - 5.14 10*6/mm3    Hemoglobin 14.5 11.5 - 15.5 g/dL    Hematocrit 46.1 (H) 34.5 - 44.0 %    MCV 72.9 (L) 80.0 - 99.0 fL    MCH 22.9 (L) 27.0 - 31.0 pg    MCHC 31.5 (L) 32.0 - 36.0 g/dL    RDW 15.2 (H) 11.3 - 14.5 %    RDW-SD 40.1 37.0 - 54.0 fl    MPV 11.4 6.0 - 12.0 fL    Platelets 220 150 - 450 10*3/mm3   TSH   Result Value Ref Range    TSH 2.084 0.350 - 5.350 mIU/mL       Physical Exam   Constitutional: She is oriented to person, place, and time. She appears well-developed and well-nourished.   HENT:   Head: Normocephalic and atraumatic.   Right Ear: Hearing and external ear normal. A middle ear effusion is present.   Left Ear: Hearing and external ear normal. A middle ear effusion is present.   Nose: Mucosal edema and rhinorrhea present. Right sinus exhibits no maxillary sinus tenderness and no frontal sinus tenderness. Left sinus exhibits no maxillary sinus tenderness and no frontal sinus tenderness.   Mouth/Throat: Uvula is midline and mucous membranes are normal. Posterior oropharyngeal erythema present. No oropharyngeal exudate or posterior oropharyngeal edema.   Eyes: Conjunctivae are normal.   Cardiovascular: Normal rate, regular rhythm and normal heart sounds.    Pulmonary/Chest: Effort normal and breath sounds normal.   Neurological: She is alert and oriented to person, place, and time.   Skin: Skin is warm and dry.   Psychiatric: She has a normal mood and affect. Her behavior is normal. Thought content normal.   Vitals reviewed.      Assessment/Plan   Problem List Items Addressed This Visit        Respiratory    Asthma    Relevant Orders    Ambulatory Referral to Allergy    Allergic rhinitis - Primary    Relevant Orders    Ambulatory Referral to Allergy      Refer back to Allergist, if worsens, return or go to  ER.  Patient was encouraged to keep me informed of any acute changes, lack of improvement, or any new concerning symptoms.Plan of care discussed with pt. They verbalized understanding and agreement.     Counseling provided on allergic rhinitis.    Larry Yu, APRN   11/4/2017   1:00 PM         Yes

## 2019-09-18 NOTE — PROGRESS NOTE ADULT - SUBJECTIVE AND OBJECTIVE BOX
Subjective: 54yMale with a pmhx of FALL;T11 VERTEBRAL FRACTURE;T12 VERTEBRAL FRACTURE  ^FALL;T11 VERTEBRAL FRACTURE;T12 VERTEBRAL FRACTURE  MEWS Score  No pertinent past medical history  Fall  No significant past surgical history  BACK PAIN  26  T12 vertebral fracture  T11 vertebral fracture      Pt with compression fxs T11, T12, T10 spinous process fx  Had MRI: < from: MR Lumbar Spine No Cont (09.16.19 @ 23:48) >    IMPRESSION:    1.  No evidence of acute traumatic injury involving the lumbar spine.    2.  T12 compression fracture, please see accompanying thoracic spine MRI   report.    3.  Edema within the dorsal paraspinal muscles extending from the   thoracic spine inferiorly to L2, and edema within the dorsal subcutaneous   soft tissues.    4.Mild degenerative changes without significant canal or foraminal  stenosis.    < end of copied text >      Pt seen with Dr Cardenas on rounds.  Pt doing well.  MONTE x 4.       Allergies    No Known Allergies    Intolerances        Vital Signs Last 24 Hrs  T(C): 36.1 (18 Sep 2019 07:29), Max: 36.2 (17 Sep 2019 15:35)  T(F): 97 (18 Sep 2019 07:29), Max: 97.2 (17 Sep 2019 15:35)  HR: 59 (18 Sep 2019 07:29) (59 - 72)  BP: 134/74 (18 Sep 2019 07:29) (127/64 - 135/73)  BP(mean): --  RR: 18 (18 Sep 2019 07:29) (18 - 18)  SpO2: --      acetaminophen   Tablet .. 650 milliGRAM(s) Oral every 6 hours  chlorhexidine 4% Liquid 1 Application(s) Topical <User Schedule>  heparin  Injectable 5000 Unit(s) SubCutaneous every 8 hours  ibuprofen  Tablet. 600 milliGRAM(s) Oral every 6 hours  influenza   Vaccine 0.5 milliLiter(s) IntraMuscular once  oxyCODONE    IR 5 milliGRAM(s) Oral every 4 hours PRN  pantoprazole    Tablet 40 milliGRAM(s) Oral before breakfast        09-17-19 @ 07:01  -  09-18-19 @ 07:00  --------------------------------------------------------  IN: 240 mL / OUT: 500 mL / NET: -260 mL              CBC Full  -  ( 17 Sep 2019 22:17 )  WBC Count : 6.63 K/uL  RBC Count : 3.87 M/uL  Hemoglobin : 12.5 g/dL  Hematocrit : 37.8 %  Platelet Count - Automated : 167 K/uL  Mean Cell Volume : 97.7 fL  Mean Cell Hemoglobin : 32.3 pg  Mean Cell Hemoglobin Concentration : 33.1 g/dL  Auto Neutrophil # : 3.89 K/uL  Auto Lymphocyte # : 1.93 K/uL  Auto Monocyte # : 0.52 K/uL  Auto Eosinophil # : 0.22 K/uL  Auto Basophil # : 0.03 K/uL  Auto Neutrophil % : 58.7 %  Auto Lymphocyte % : 29.1 %  Auto Monocyte % : 7.8 %  Auto Eosinophil % : 3.3 %  Auto Basophil % : 0.5 %    09-17    138  |  105  |  16  ----------------------------<  126<H>  4.2   |  23  |  0.8    Ca    8.8      17 Sep 2019 22:17  Phos  3.7     09-17  Mg     2.1     09-17            Assessment/Plan: as above  No neurosurgical intervention  Pt must wear TLSO when OOB or ambulating  follow up with Dr Cardenas as outpatient in 2-3 weeks  d/w attg

## 2019-09-18 NOTE — PROGRESS NOTE ADULT - ATTENDING COMMENTS
chest tube out today  discharge if post-pull no ptx and pt stable nsgy for final read of mri  tlso and ambulate per nsgy

## 2019-09-18 NOTE — DISCHARGE NOTE NURSING/CASE MANAGEMENT/SOCIAL WORK - PATIENT PORTAL LINK FT
You can access the FollowMyHealth Patient Portal offered by [facility] by registering at the following website: [email or web address]. By joining [facility]’s FollowMyHealth portal, you will also be able to view your health information using other applications (apps) compatible with our system.

## 2019-09-18 NOTE — DISCHARGE NOTE PROVIDER - CARE PROVIDER_API CALL
Neville Cardenas)  [facility]  [address] [place] [address], [address]  [place] [address]  Phone: [phone]  Fax: [phone]  Follow Up Time:

## 2019-09-18 NOTE — DISCHARGE NOTE NURSING/CASE MANAGEMENT/SOCIAL WORK - NSDCPEEMAIL_GEN_ALL_CORE
[facility] for Tobacco Control email [email or web address]

## 2019-09-18 NOTE — PROGRESS NOTE ADULT - SUBJECTIVE AND OBJECTIVE BOX
GENERAL SURGERY PROGRESS NOTE     Spoke to  phone #[phone] SUSHMA Madrid  54y  Male  Hospital day :2d    OVERNIGHT EVENTS: No acute events overnight    T(F): 97 (09-18-19 @ 07:29), Max: 97.2 (09-17-19 @ 15:35)  HR: 59 (09-18-19 @ 07:29) (59 - 72)  BP: 134/74 (09-18-19 @ 07:29) (127/64 - 135/73)  RR: 18 (09-18-19 @ 07:29) (18 - 18)       GI proph:  pantoprazole    Tablet 40 milliGRAM(s) Oral before breakfast    AC/ proph: heparin  Injectable 5000 Unit(s) SubCutaneous every 8 hours      PHYSICAL EXAM:  GENERAL: NAD, well-appearing  CHEST/LUNG: Clear to auscultation bilaterally  HEART: Regular rate and rhythm  ABDOMEN: Soft, Nontender, Nondistended;   EXTREMITIES:  No clubbing, cyanosis, or edema    LABS                        12.5   6.63  )-----------( 167      ( 17 Sep 2019 22:17 )             37.8       Auto Neutrophil %: 58.7 % (09-17-19 @ 22:17)  Auto Immature Granulocyte %: 0.6 % (09-17-19 @ 22:17)    09-17    138  |  105  |  16  ----------------------------<  126<H>  4.2   |  23  |  0.8      Calcium, Total Serum: 8.8 mg/dL (09-17-19 @ 22:17)     Lactate, Blood: 1.0 mmol/L (09-16-19 @ 07:26)      RADIOLOGY & ADDITIONAL TESTS:  no new studies

## 2019-09-18 NOTE — DISCHARGE NOTE PROVIDER - HOSPITAL COURSE
54M w/ no PMHx s/p fall on Friday 9/13 while intoxicated. Pt  fell off of an 8ft porch and a friend told him he landed on his stomach. Seen at [facility] but left before evaluation. Since then has been having lower back pain and epigastric pain. Denies daily Etoh use. Denies blood thinner use. CT scan showed chance type T11-T12 fracture. The patient was admitted for observation and for an MRI. Once MRI was done and read, Neurosurgery stated it was ok to ambulate with TLSO brace, the patient did so without difficulty. The patient will be discharged to home with instructions to follow up with Neurosurgery in 1-2 weeks.

## 2019-09-18 NOTE — DISCHARGE NOTE PROVIDER - NSDCCPCAREPLAN_GEN_ALL_CORE_FT
PRINCIPAL DISCHARGE DIAGNOSIS  Diagnosis: Fall  Assessment and Plan of Treatment: See below.      SECONDARY DISCHARGE DIAGNOSES  Diagnosis: T12 vertebral fracture  Assessment and Plan of Treatment: You must wear TLSO brace to ambulate.  Take tylenol and motrin as needed for pain.  Follow up with Dr. Cardenas in 1-2 weeks, call to schedule the appointment.    Diagnosis: T11 vertebral fracture  Assessment and Plan of Treatment: see above

## 2019-09-18 NOTE — DISCHARGE NOTE NURSING/CASE MANAGEMENT/SOCIAL WORK - NSDCPEWEB_GEN_ALL_CORE
[facility] for Tobacco Control website --- [email or web address] website --- [email or web address]
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
## 2019-09-23 DIAGNOSIS — S22.089A UNSPECIFIED FRACTURE OF T11-T12 VERTEBRA, INITIAL ENCOUNTER FOR CLOSED FRACTURE: ICD-10-CM

## 2019-09-23 DIAGNOSIS — R10.13 EPIGASTRIC PAIN: ICD-10-CM
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
## 2019-11-18 ENCOUNTER — EMERGENCY (EMERGENCY)
Facility: HOSPITAL | Age: 54
LOS: 0 days | Discharge: HOME | End: 2019-11-18
Attending: EMERGENCY MEDICINE | Admitting: EMERGENCY MEDICINE
Payer: MEDICAID

## 2019-11-18 VITALS
HEART RATE: 68 BPM | RESPIRATION RATE: 18 BRPM | OXYGEN SATURATION: 98 % | TEMPERATURE: 98 F | SYSTOLIC BLOOD PRESSURE: 136 MMHG | DIASTOLIC BLOOD PRESSURE: 90 MMHG

## 2019-11-18 DIAGNOSIS — S22.078A OTHER FRACTURE OF T9-T10 VERTEBRA, INITIAL ENCOUNTER FOR CLOSED FRACTURE: ICD-10-CM
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
## 2019-11-18 DIAGNOSIS — G89.29 OTHER CHRONIC PAIN: ICD-10-CM

## 2019-11-18 DIAGNOSIS — M54.6 PAIN IN THORACIC SPINE: ICD-10-CM

## 2019-11-18 DIAGNOSIS — S22.088A OTHER FRACTURE OF T11-T12 VERTEBRA, INITIAL ENCOUNTER FOR CLOSED FRACTURE: ICD-10-CM

## 2019-11-18 DIAGNOSIS — S39.92XA UNSPECIFIED INJURY OF LOWER BACK, INITIAL ENCOUNTER: ICD-10-CM
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
## 2019-11-18 PROCEDURE — 99284 EMERGENCY DEPT VISIT MOD MDM: CPT

## 2019-11-18 RX ORDER — IBUPROFEN 200 MG
800 TABLET ORAL ONCE
Refills: 0 | Status: COMPLETED | OUTPATIENT
Start: 2019-11-18 | End: 2019-11-18

## 2019-11-18 RX ORDER — METHOCARBAMOL 500 MG/1
2 TABLET, FILM COATED ORAL
Qty: 14 | Refills: 0
Start: 2019-11-18 | End: 2019-11-24

## 2019-11-18 RX ORDER — IBUPROFEN 200 MG
1 TABLET ORAL
Qty: 15 | Refills: 0
Start: 2019-11-18 | End: 2019-11-22

## 2019-11-18 RX ORDER — METHOCARBAMOL 500 MG/1
1000 TABLET, FILM COATED ORAL ONCE
Refills: 0 | Status: COMPLETED | OUTPATIENT
Start: 2019-11-18 | End: 2019-11-18

## 2019-11-18 RX ADMIN — Medication 800 MILLIGRAM(S): at 12:26

## 2019-11-18 RX ADMIN — Medication 800 MILLIGRAM(S): at 12:27

## 2019-11-18 RX ADMIN — METHOCARBAMOL 1000 MILLIGRAM(S): 500 TABLET, FILM COATED ORAL at 12:26

## 2019-11-18 NOTE — ED PROVIDER NOTE - CARE PLAN
Principal Discharge DX:	Lumbar pain Principal Discharge DX:	Spinal fracture of T10 vertebra  Secondary Diagnosis:	Spinal fracture of T11 vertebra  Secondary Diagnosis:	Spinal fracture of T12 vertebra Principal Discharge DX:	Spinal fracture of T10 vertebra  Secondary Diagnosis:	Spinal fracture of T11 vertebra  Secondary Diagnosis:	Spinal fracture of T12 vertebra  Secondary Diagnosis:	Chronic thoracic back pain, unspecified back pain laterality

## 2019-11-18 NOTE — ED PROVIDER NOTE - CLINICAL SUMMARY MEDICAL DECISION MAKING FREE TEXT BOX
55 yo male with lower thoracic back pain for 2 months since fall.  Pain is due to spinous process and compression fx , [pain meds prescribed and advised to follow up in clinic, patient verbalized understanding and is amenable with the plan.

## 2019-11-18 NOTE — ED PROVIDER NOTE - NS ED ROS FT
Constitutional: (-) fever (-) vomiting  Eyes/ENT: (-) eye discharge, (-) runny nose  Cardiovascular: (-) chest pain, (-) syncope  Respiratory: (-) cough, (-) shortness of breath  Gastrointestinal: (-) vomiting, (-) diarrhea, (-) abdominal pain  : (-) dysuria   Musculoskeletal: (-) neck pain, (+) back pain, (-) joint pain  Integumentary: (-) rash, (-) edema  Neurological: (-) headache, (-)loc  Allergic/Immunologic: (-) pruritus  Endocrine: No history of thyroid disease or diabetes.

## 2019-11-18 NOTE — ED PROVIDER NOTE - PROVIDER TOKENS
FREE:[LAST:[[facility]],PHONE:[phone]],FAX:[(   )    -],ADDRESS:[[address] [place] [address]],FOLLOWUP:[1-3 Days]]

## 2019-11-18 NOTE — ED PROVIDER NOTE - NSFOLLOWUPCLINICS_GEN_ALL_ED_FT
[facility]  Orthpedic  [address], [place]   Phone: [phone]  Fax:   Follow Up Time: 1-3 Days Neurosurgery at [place]  Neurosurgery  [address] [place] [address], [address]  [place] [address]  Phone: [phone]  Fax:   Follow Up Time: 1-3 Days

## 2019-11-18 NOTE — ED PROVIDER NOTE - SECONDARY DIAGNOSIS.
Spinal fracture of T11 vertebra Spinal fracture of T12 vertebra Chronic thoracic back pain, unspecified back pain laterality

## 2019-11-18 NOTE — ED PROVIDER NOTE - CARE PROVIDER_API CALL
[facility],   [address] [place] [address]  Phone: [phone]  Fax: (   )    -  Follow Up Time: 1-3 Days

## 2019-11-18 NOTE — ED PROVIDER NOTE - PATIENT PORTAL LINK FT
You can access the FollowMyHealth Patient Portal offered by  by registering at the following website: [email or web address]. By joining [facility]’s FollowMyHealth portal, you will also be able to view your health information using other applications (apps) compatible with our system.

## 2019-11-18 NOTE — ED PROVIDER NOTE - PHYSICAL EXAMINATION
CONSTITUTIONAL: Well-developed; well-nourished; in no acute distress.   SKIN: warm, dry  HEAD: Normocephalic; atraumatic.  EYES: PERRL, EOMI, no conjunctival erythema  ENT: No nasal discharge; airway clear.  NECK: Supple; non tender.  CARD: S1, S2 normal; no murmurs, gallops, or rubs. Regular rate and rhythm.   RESP: No wheezes, rales or rhonchi.  ABD: soft ntnd  EXT: Normal ROM.  No clubbing, cyanosis or edema.   LYMPH: No acute cervical adenopathy.  NEURO: Alert, oriented, grossly unremarkable  PSYCH: Cooperative, appropriate.  BACK: R paraspinal tenderness T10-T12. No saddle anesthesia, L1-S1 intact to light touch. GI: Rectal tone intact.

## 2019-11-18 NOTE — ED PROVIDER NOTE - OBJECTIVE STATEMENT
54M with no significant pmh presents with R back pain s/p admission 2 months ago for back injury when he fell from height of 10 ft onto back. PT was admitted for evaluation of spinal fractures T10-12 and discharged s/p neurosurgery evaluation with no plans for surgery. PT denies any numbness, tingling, saddle anesthesia, urinary retention, but admits to constipation since the incident.

## 2019-11-18 NOTE — ED ADULT NURSE NOTE - NS ED PATIENT SAFETY CONCERN
5/10/2019  Mely Dee    PROCEDURE PERFORMED:   Esophagogastroduodenoscopy with biopsies.     INDICATION FOR PROCEDURE:   Epigastric abdominal pain     POSTPROCEDURAL DIAGNOSES:   1. Gastric ulcer in the antrum 1 cm white base no active bleeding, biopsies obtained  2. Status post Nissen fundoplication in good position    MEDICATIONS:   as per Anesthesia    DESCRIPTION OF PROCEDURE:   After risks, benefits and alternatives of the procedure were explained, informed consent was obtained. The EGD scope was introduced to the mouth and advanced to the second portion of the duodenum. The instrument was withdrawn as the mucosa was examined.     FINDINGS:   Duodenum, first and second portion were normal. Gastric ulcer in the antrum 1 cm white base no active bleeding, biopsies obtained. Gastric antrum had mucosal erythema, edema consistent with gastritis. Status post Nissen fundoplication in good position. Retroflexion in the stomach did not reveal any other abnormalities. GE junction was normal. Esophagus was normal. Scope was removed and procedure was terminated.     COMPLICATIONS:   None.     ENDOSCOPIC IMPRESSION:   1. Gastric ulcer in the antrum 1 cm white base no active bleeding, biopsies obtained  2. Status post Nissen fundoplication in good position    RECOMMENDATION:   1. Follow up biopsies.   2. Proton pump inhibitors b.i.d. Avoid NSAIDs.   3. EGD in 3 months to assess for healing of gastric ulcer    Thank you very much for allowing me to participate in the management of this patient     No

## 2019-11-18 NOTE — ED ADULT NURSE NOTE - LOCATION
----- Message from Jahaira Venegas DO sent at 8/2/2019 10:44 AM CDT -----  Please let the patient know his a1c was 7.1. His Lipid panel high triglycerides and low HDL. I recommend he decrease the fat in his diet and increase exercise- we can recheck lab in 6 months with his next DM labs. CMP and urine micro albumin was overall normal.   back

## 2019-11-18 NOTE — ED PROVIDER NOTE - ATTENDING CONTRIBUTION TO CARE
53 yo male with lower thoracic back pain for 2 months s/p fall in September. Pain is non-radiating, present every day, worse with bending and lifting, no associated complaints such as CP, SOB, abdominal pain, N.V/ black or bloody stools, weight loss, focal weakness or paresthesias, no bowel or bladder dysfunction.  Patient says that he was told on discharge from hospital that " everything is OK" and given prescription for " small white pills for 10 days".  Well-appearing, NAD, PERRL, mmm, nml work of breathing , lungs CTA b/l, + rt lower thoracic paraspinal ttp, skin nml color, abdomen soft, NT/ND, no focal neuro deficits.  Will treat pain, advised to follow up with neuroSx and rehab clinics.

## 2019-11-18 NOTE — ED PROVIDER NOTE - PROGRESS NOTE DETAILS
The patient appears well, pain is chronic and due to T11,T12 compression fx diagnosed on 9/16/19, patient was given back brace, but he does not wear it because it is not comfortable.  He states that he was never told that he had a fracture and had no follow up.  OTC meds have provided him temporary relief.  Copies of test  done during his September admission were given to him, he was advised to follow up in neurosurgery and rehab clinic.  He verbalized understanding and is amenable with the plan. IMAN performed, rectal tone intact.

## 2019-11-18 NOTE — ED ADULT TRIAGE NOTE - CHIEF COMPLAINT QUOTE
Pt reports he feel 2 months ago, and since then has been experiencing lower back worse on the right side

## 2019-11-19 ENCOUNTER — APPOINTMENT (OUTPATIENT)
Dept: INTERNAL MEDICINE | Facility: CLINIC | Age: 54
End: 2019-11-19

## 2019-11-20 ENCOUNTER — APPOINTMENT (OUTPATIENT)
Dept: NEUROSURGERY | Facility: CLINIC | Age: 54
End: 2019-11-20
Payer: MEDICAID

## 2019-11-20 VITALS — HEIGHT: 66 IN | WEIGHT: 215 LBS | BODY MASS INDEX: 34.55 KG/M2

## 2019-11-20 DIAGNOSIS — Z86.69 PERSONAL HISTORY OF OTHER DISEASES OF THE NERVOUS SYSTEM AND SENSE ORGANS: ICD-10-CM

## 2019-11-20 DIAGNOSIS — F17.200 NICOTINE DEPENDENCE, UNSPECIFIED, UNCOMPLICATED: ICD-10-CM

## 2019-11-20 DIAGNOSIS — Z78.9 OTHER SPECIFIED HEALTH STATUS: ICD-10-CM

## 2019-11-20 PROCEDURE — 99214 OFFICE O/P EST MOD 30 MIN: CPT

## 2019-11-20 RX ORDER — CHLORZOXAZONE 750 MG/1
750 TABLET ORAL 3 TIMES DAILY
Qty: 90 | Refills: 2 | Status: DISCONTINUED | COMMUNITY
Start: 2019-11-20 | End: 2019-11-20

## 2019-11-20 NOTE — REASON FOR VISIT
[Consultation] : a consultation visit [Referred By: _________] : Patient was referred by YAMEL [[employer] ] : provided by [employer]   [FreeTextEntry1] : [id number] [TWNoteComboBox1] : [place]an

## 2019-11-20 NOTE — DATA REVIEWED
[de-identified] : \par - CT scan 9/16/19: Nondisplaced fractures of the T10 spinous process and lamina extending into the facets.  [de-identified] : \par - MRI of the thoracic and lumbar spine 9/16/19: acute compression fractures T11 / 12. Paraspinal edema T5-L1.

## 2019-11-20 NOTE — HISTORY OF PRESENT ILLNESS
[de-identified] : Mr. Kline presents today with persistent right sided back pain and spasms s/p fall on 9/16/19. He fell 8-10 feet to the ground landing on his back. He denies radiculopathy. He also denies axial back pain, numbness, or paresthesias. At times the right sided pain will refer into his abdomen.  He went to the ER that day and was discharged home. On 11/18/19 he went back to the ER due to continued pain and was discharged to follow up outpatient. He was prescribed Robaxin and Ibuprofen to take as needed. He is not taking this medication since it did not help him. Since the fall he has not trialed bracing, PT, or pain management.

## 2019-11-20 NOTE — ASSESSMENT
[FreeTextEntry1] : We had a long and thorough discussion regarding his findings. I will order him a TLSO brace to wear for comfort. He will also be started on PT for his pain and spasms. Heat treatments and Lorzone as needed may help with his muscular spasms. I would like a repeat MRI of the thoracic spine to assess acuity of his known thoracic fractures. I will see him back once the MRI is completed.

## 2019-11-20 NOTE — PHYSICAL EXAM
[General Appearance - Alert] : alert [General Appearance - In No Acute Distress] : in no acute distress [General Appearance - Well Developed] : well developed [General Appearance - Well Nourished] : well nourished [Oriented To Time, Place, And Person] : oriented to person, place, and time [General Appearance - Well-Appearing] : healthy appearing [Cranial Nerves Oculomotor (III)] : extraocular motion intact [Cranial Nerves Optic (II)] : visual acuity intact bilaterally,  pupils equal round and reactive to light [Cranial Nerves Trigeminal (V)] : facial sensation intact symmetrically [Cranial Nerves Facial (VII)] : face symmetrical [Cranial Nerves Glossopharyngeal (IX)] : tongue and palate midline [Cranial Nerves Vestibulocochlear (VIII)] : hearing was intact bilaterally [Cranial Nerves Accessory (XI - Cranial And Spinal)] : head turning and shoulder shrug symmetric [Cranial Nerves Hypoglossal (XII)] : there was no tongue deviation with protrusion [T-Spine ___ (level)] : ~Ulevel [unfilled] thoracic spine [Right Paraspinal ___ (level)] : ~Ulevel [unfilled] right paraspinal [Restricted] : was restricted [Pain] : was painful [Intact] : all reflexes within normal limits bilaterally

## 2019-11-25 ENCOUNTER — RX CHANGE (OUTPATIENT)
Age: 54
End: 2019-11-25

## 2019-11-26 ENCOUNTER — OUTPATIENT (OUTPATIENT)
Dept: OUTPATIENT SERVICES | Facility: HOSPITAL | Age: 54
LOS: 1 days | Discharge: HOME | End: 2019-11-26
Payer: MEDICAID

## 2019-11-26 DIAGNOSIS — S22.000A WEDGE COMPRESSION FRACTURE OF UNSPECIFIED THORACIC VERTEBRA, INITIAL ENCOUNTER FOR CLOSED FRACTURE: ICD-10-CM

## 2019-11-26 PROCEDURE — 72146 MRI CHEST SPINE W/O DYE: CPT | Mod: 26

## 2019-12-04 ENCOUNTER — APPOINTMENT (OUTPATIENT)
Dept: NEUROSURGERY | Facility: CLINIC | Age: 54
End: 2019-12-04
Payer: MEDICAID

## 2019-12-04 VITALS — WEIGHT: 200 LBS | HEIGHT: 66 IN | BODY MASS INDEX: 32.14 KG/M2

## 2019-12-04 PROCEDURE — 99214 OFFICE O/P EST MOD 30 MIN: CPT

## 2019-12-05 NOTE — ASSESSMENT
[FreeTextEntry1] : I discussed the MRI and clinical findings with Mr. Kline in detail.  I believe he is symptomatic from his compression fractures.  He has failed over 6 weeks of physician directed medical management and PT.  I recommend a T11 and T12 kyphoplasty.  Risks and benefits were discussed and he wishes to proceed.

## 2019-12-05 NOTE — HISTORY OF PRESENT ILLNESS
[FreeTextEntry1] : Patient presents today in follow up reporting of right sided mid back pain which worsens as the day progress, numbness on the anterior right torso and right ankle pain. Symptoms started after he fell down in September 2019. Pain is alleviate with heat and when he takes the muscle relaxer. Patient has not started physical therapy. \par \par \par MRI of the thoracic spine without contrast done on 11/26/19 showed Slight interval increase in the degree of T11 and T12 mild compression fractures, with associated bone marrow edema.

## 2019-12-12 ENCOUNTER — OUTPATIENT (OUTPATIENT)
Dept: OUTPATIENT SERVICES | Facility: HOSPITAL | Age: 54
LOS: 1 days | Discharge: HOME | End: 2019-12-12
Payer: MEDICAID

## 2019-12-12 VITALS
HEIGHT: 67 IN | SYSTOLIC BLOOD PRESSURE: 128 MMHG | TEMPERATURE: 97 F | RESPIRATION RATE: 16 BRPM | DIASTOLIC BLOOD PRESSURE: 67 MMHG | HEART RATE: 66 BPM | WEIGHT: 211.64 LBS | OXYGEN SATURATION: 97 %

## 2019-12-12 DIAGNOSIS — S22.000A WEDGE COMPRESSION FRACTURE OF UNSPECIFIED THORACIC VERTEBRA, INITIAL ENCOUNTER FOR CLOSED FRACTURE: ICD-10-CM

## 2019-12-12 DIAGNOSIS — Z01.818 ENCOUNTER FOR OTHER PREPROCEDURAL EXAMINATION: ICD-10-CM

## 2019-12-12 LAB
ALBUMIN SERPL ELPH-MCNC: 4.7 G/DL — SIGNIFICANT CHANGE UP (ref 3.5–5.2)
ALP SERPL-CCNC: 85 U/L — SIGNIFICANT CHANGE UP (ref 30–115)
ALT FLD-CCNC: 19 U/L — SIGNIFICANT CHANGE UP (ref 0–41)
ANION GAP SERPL CALC-SCNC: 16 MMOL/L — HIGH (ref 7–14)
APPEARANCE UR: CLEAR — SIGNIFICANT CHANGE UP
APTT BLD: 33.4 SEC — SIGNIFICANT CHANGE UP (ref 27–39.2)
AST SERPL-CCNC: 21 U/L — SIGNIFICANT CHANGE UP (ref 0–41)
BASOPHILS # BLD AUTO: 0.04 K/UL — SIGNIFICANT CHANGE UP (ref 0–0.2)
BASOPHILS NFR BLD AUTO: 0.5 % — SIGNIFICANT CHANGE UP (ref 0–1)
BILIRUB SERPL-MCNC: 0.5 MG/DL — SIGNIFICANT CHANGE UP (ref 0.2–1.2)
BILIRUB UR-MCNC: NEGATIVE — SIGNIFICANT CHANGE UP
BUN SERPL-MCNC: 16 MG/DL — SIGNIFICANT CHANGE UP (ref 10–20)
CALCIUM SERPL-MCNC: 9.2 MG/DL — SIGNIFICANT CHANGE UP (ref 8.5–10.1)
CHLORIDE SERPL-SCNC: 102 MMOL/L — SIGNIFICANT CHANGE UP (ref 98–110)
CO2 SERPL-SCNC: 24 MMOL/L — SIGNIFICANT CHANGE UP (ref 17–32)
COLOR SPEC: YELLOW — SIGNIFICANT CHANGE UP
CREAT SERPL-MCNC: 0.7 MG/DL — SIGNIFICANT CHANGE UP (ref 0.7–1.5)
DIFF PNL FLD: NEGATIVE — SIGNIFICANT CHANGE UP
EOSINOPHIL # BLD AUTO: 0.25 K/UL — SIGNIFICANT CHANGE UP (ref 0–0.7)
EOSINOPHIL NFR BLD AUTO: 3.1 % — SIGNIFICANT CHANGE UP (ref 0–8)
ESTIMATED AVERAGE GLUCOSE: 131 MG/DL — HIGH (ref 68–114)
GLUCOSE SERPL-MCNC: 99 MG/DL — SIGNIFICANT CHANGE UP (ref 70–99)
GLUCOSE UR QL: NEGATIVE — SIGNIFICANT CHANGE UP
HBA1C BLD-MCNC: 6.2 % — HIGH (ref 4–5.6)
HCT VFR BLD CALC: 43.3 % — SIGNIFICANT CHANGE UP (ref 42–52)
HGB BLD-MCNC: 14.3 G/DL — SIGNIFICANT CHANGE UP (ref 14–18)
IMM GRANULOCYTES NFR BLD AUTO: 0.5 % — HIGH (ref 0.1–0.3)
INR BLD: 1.01 RATIO — SIGNIFICANT CHANGE UP (ref 0.65–1.3)
KETONES UR-MCNC: SIGNIFICANT CHANGE UP
LEUKOCYTE ESTERASE UR-ACNC: NEGATIVE — SIGNIFICANT CHANGE UP
LYMPHOCYTES # BLD AUTO: 2.16 K/UL — SIGNIFICANT CHANGE UP (ref 1.2–3.4)
LYMPHOCYTES # BLD AUTO: 27.1 % — SIGNIFICANT CHANGE UP (ref 20.5–51.1)
MCHC RBC-ENTMCNC: 31.8 PG — HIGH (ref 27–31)
MCHC RBC-ENTMCNC: 33 G/DL — SIGNIFICANT CHANGE UP (ref 32–37)
MCV RBC AUTO: 96.4 FL — HIGH (ref 80–94)
MONOCYTES # BLD AUTO: 0.79 K/UL — HIGH (ref 0.1–0.6)
MONOCYTES NFR BLD AUTO: 9.9 % — HIGH (ref 1.7–9.3)
NEUTROPHILS # BLD AUTO: 4.69 K/UL — SIGNIFICANT CHANGE UP (ref 1.4–6.5)
NEUTROPHILS NFR BLD AUTO: 58.9 % — SIGNIFICANT CHANGE UP (ref 42.2–75.2)
NITRITE UR-MCNC: NEGATIVE — SIGNIFICANT CHANGE UP
NRBC # BLD: 0 /100 WBCS — SIGNIFICANT CHANGE UP (ref 0–0)
PH UR: 6 — SIGNIFICANT CHANGE UP (ref 5–8)
PLATELET # BLD AUTO: 210 K/UL — SIGNIFICANT CHANGE UP (ref 130–400)
POTASSIUM SERPL-MCNC: 4.2 MMOL/L — SIGNIFICANT CHANGE UP (ref 3.5–5)
POTASSIUM SERPL-SCNC: 4.2 MMOL/L — SIGNIFICANT CHANGE UP (ref 3.5–5)
PROT SERPL-MCNC: 7.2 G/DL — SIGNIFICANT CHANGE UP (ref 6–8)
PROT UR-MCNC: SIGNIFICANT CHANGE UP
PROTHROM AB SERPL-ACNC: 11.6 SEC — SIGNIFICANT CHANGE UP (ref 9.95–12.87)
RBC # BLD: 4.49 M/UL — LOW (ref 4.7–6.1)
RBC # FLD: 12.6 % — SIGNIFICANT CHANGE UP (ref 11.5–14.5)
SODIUM SERPL-SCNC: 142 MMOL/L — SIGNIFICANT CHANGE UP (ref 135–146)
SP GR SPEC: 1.04 — HIGH (ref 1.01–1.02)
UROBILINOGEN FLD QL: SIGNIFICANT CHANGE UP
WBC # BLD: 7.97 K/UL — SIGNIFICANT CHANGE UP (ref 4.8–10.8)
WBC # FLD AUTO: 7.97 K/UL — SIGNIFICANT CHANGE UP (ref 4.8–10.8)

## 2019-12-12 PROCEDURE — 93010 ELECTROCARDIOGRAM REPORT: CPT

## 2019-12-12 NOTE — H&P PST ADULT - AS O2 DELIVERY
For information on Fall & Injury Prevention, visit [email or web address]
room air

## 2019-12-12 NOTE — H&P PST ADULT - HISTORY OF PRESENT ILLNESS
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 He had an MRI to the lower back for continued pain and 2 fractures were found. He also admits to neck pain since the fall. He does have difficulty with bowels since then and has numbness and tingling in the hands. He does report right heel pain with prolonged walking.

## 2019-12-12 NOTE — H&P PST ADULT - NSANTHOSAYNRD_GEN_A_CORE
No. RODRÍGUEZ screening performed.  STOP BANG Legend: 0-2 = LOW Risk; 3-4 = INTERMEDIATE Risk; 5-8 = HIGH Risk

## 2019-12-12 NOTE — H&P PST ADULT - REASON FOR ADMISSION
Citizen of [place] speaking 55 yo male here for PSAT for Thoracic Eleven-twelve kyphoplasty (c-arm) ( needs patient in prone position, Shahid frame) Ethiopian speaking 55 yo male here for PSAT for Thoracic Eleven-Twelve kyphoplasty (c-arm) ( needs patient in prone position, Shahid frame)

## 2019-12-12 NOTE — H&P PST ADULT - NSANTHTOTALSCORECAL_ENT_A_CORE
[FreeTextEntry8] : 2 week cough and weak voice\par has been to urgent care\par has had zp ac and cough medicine also azelastin spray
4

## 2019-12-17 PROBLEM — G56.00 CARPAL TUNNEL SYNDROME, UNSPECIFIED UPPER LIMB: Chronic | Status: ACTIVE | Noted: 2019-12-12

## 2019-12-17 PROBLEM — M54.9 DORSALGIA, UNSPECIFIED: Chronic | Status: ACTIVE | Noted: 2019-12-12

## 2019-12-26 ENCOUNTER — APPOINTMENT (OUTPATIENT)
Dept: NEUROSURGERY | Facility: HOSPITAL | Age: 54
End: 2019-12-26

## 2019-12-26 ENCOUNTER — OUTPATIENT (OUTPATIENT)
Dept: OUTPATIENT SERVICES | Facility: HOSPITAL | Age: 54
LOS: 1 days | Discharge: HOME | End: 2019-12-26
Payer: SUBSIDIZED

## 2019-12-26 ENCOUNTER — RESULT REVIEW (OUTPATIENT)
Age: 54
End: 2019-12-26

## 2019-12-26 VITALS
OXYGEN SATURATION: 95 % | TEMPERATURE: 98 F | HEART RATE: 94 BPM | DIASTOLIC BLOOD PRESSURE: 70 MMHG | SYSTOLIC BLOOD PRESSURE: 139 MMHG | RESPIRATION RATE: 16 BRPM

## 2019-12-26 VITALS
DIASTOLIC BLOOD PRESSURE: 73 MMHG | TEMPERATURE: 98 F | SYSTOLIC BLOOD PRESSURE: 129 MMHG | RESPIRATION RATE: 18 BRPM | WEIGHT: 210.1 LBS | HEART RATE: 67 BPM | HEIGHT: 66 IN
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
## 2019-12-26 DIAGNOSIS — M54.9 DORSALGIA, UNSPECIFIED: ICD-10-CM

## 2019-12-26 DIAGNOSIS — S22.081A STABLE BURST FRACTURE OF T11-T12 VERTEBRA, INITIAL ENCOUNTER FOR CLOSED FRACTURE: ICD-10-CM
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
## 2019-12-26 PROCEDURE — 22515 PERQ VERTEBRAL AUGMENTATION: CPT

## 2019-12-26 PROCEDURE — 22513 PERQ VERTEBRAL AUGMENTATION: CPT

## 2019-12-26 PROCEDURE — 88311 DECALCIFY TISSUE: CPT | Mod: 26

## 2019-12-26 PROCEDURE — 88305 TISSUE EXAM BY PATHOLOGIST: CPT | Mod: 26

## 2019-12-26 RX ORDER — ONDANSETRON 8 MG/1
4 TABLET, FILM COATED ORAL ONCE
Refills: 0 | Status: DISCONTINUED | OUTPATIENT
Start: 2019-12-26 | End: 2019-12-27

## 2019-12-26 RX ORDER — ACETAMINOPHEN 500 MG
650 TABLET ORAL ONCE
Refills: 0 | Status: COMPLETED | OUTPATIENT
Start: 2019-12-26 | End: 2019-12-26

## 2019-12-26 RX ORDER — HYDROMORPHONE HYDROCHLORIDE 2 MG/ML
0.5 INJECTION INTRAMUSCULAR; INTRAVENOUS; SUBCUTANEOUS
Refills: 0 | Status: DISCONTINUED | OUTPATIENT
Start: 2019-12-26 | End: 2019-12-27

## 2019-12-26 RX ORDER — SODIUM CHLORIDE 9 MG/ML
1000 INJECTION, SOLUTION INTRAVENOUS
Refills: 0 | Status: DISCONTINUED | OUTPATIENT
Start: 2019-12-26 | End: 2019-12-27

## 2019-12-26 RX ORDER — OXYCODONE AND ACETAMINOPHEN 5; 325 MG/1; MG/1
2 TABLET ORAL ONCE
Refills: 0 | Status: DISCONTINUED | OUTPATIENT
Start: 2019-12-26 | End: 2019-12-27

## 2019-12-26 RX ORDER — TRAMADOL HYDROCHLORIDE 50 MG/1
0 TABLET ORAL
Qty: 0 | Refills: 0 | DISCHARGE

## 2019-12-26 RX ADMIN — HYDROMORPHONE HYDROCHLORIDE 0.5 MILLIGRAM(S): 2 INJECTION INTRAMUSCULAR; INTRAVENOUS; SUBCUTANEOUS at 18:36

## 2019-12-26 RX ADMIN — Medication 650 MILLIGRAM(S): at 18:18

## 2019-12-26 RX ADMIN — Medication 650 MILLIGRAM(S): at 17:58

## 2019-12-26 RX ADMIN — SODIUM CHLORIDE 100 MILLILITER(S): 9 INJECTION, SOLUTION INTRAVENOUS at 17:58

## 2019-12-26 NOTE — CHART NOTE - NSCHARTNOTEFT_GEN_A_CORE
PACU ANESTHESIA ADMISSION NOTE      Procedure: Thoracic kyphoplasty    Post op diagnosis:      ____  Intubated  TV:______       Rate: ______      FiO2: ______    __x__  Patent Airway    _x___  Full return of protective reflexes    ___x_  Full recovery from anesthesia / back to baseline status    Vitals:  T(F): 97.4 (12-26-19 @ 17:25), Max: 36.8 (12-26-19 @ 12:49)  HR: 100 (12-26-19 @ 17:25) (67 - 67)  BP: 138/74 (12-26-19 @ 17:25) (129/73 - 129/73)  RR: 18 (12-26-19 @ 17:25) (18 - 18)  SpO2: 99%    Mental Status:  __x__ Awake   ____x_ Alert   _____ Drowsy   _____ Sedated    Nausea/Vomiting:  __x__ NO  ______Yes,   See Post - Op Orders          Pain Scale (0-10):  ___5__    Treatment: ____ None    ___x_ See Post - Op/PCA Orders    Post - Operative Fluids:   ____ Oral   __x__ See Post - Op Orders    Plan: Discharge:   ___x_Home       _____Floor     _____Critical Care    _____  Other:_________________    Comments: Transferred care to PACU; discharge to home when criteria met.

## 2019-12-26 NOTE — ASU DISCHARGE PLAN (ADULT/PEDIATRIC) - CALL YOUR DOCTOR IF YOU HAVE ANY OF THE FOLLOWING:
Pain not relieved by Medications/Fever greater than (need to indicate Fahrenheit or Celsius)/Bleeding that does not stop/Swelling that gets worse/Wound/Surgical Site with redness, or foul smelling discharge or pus

## 2019-12-26 NOTE — ASU DISCHARGE PLAN (ADULT/PEDIATRIC) - CARE PROVIDER_API CALL
January Castano)  Neurological Surgery  [address] [facility], [address]  [place] [address]  Phone: [phone]  Fax: [phone]  Follow Up Time:

## 2019-12-30 LAB — SURGICAL PATHOLOGY STUDY: SIGNIFICANT CHANGE UP

## 2020-01-06 ENCOUNTER — APPOINTMENT (OUTPATIENT)
Dept: NEUROSURGERY | Facility: CLINIC | Age: 55
End: 2020-01-06

## 2020-01-06 ENCOUNTER — APPOINTMENT (OUTPATIENT)
Dept: NEUROSURGERY | Facility: CLINIC | Age: 55
End: 2020-01-06
Payer: MEDICAID

## 2020-01-06 VITALS — HEIGHT: 66 IN | WEIGHT: 200 LBS | BODY MASS INDEX: 32.14 KG/M2

## 2020-01-06 DIAGNOSIS — M79.10 MYALGIA, UNSPECIFIED SITE: ICD-10-CM

## 2020-01-06 PROCEDURE — 99214 OFFICE O/P EST MOD 30 MIN: CPT

## 2020-01-06 NOTE — HISTORY OF PRESENT ILLNESS
[FreeTextEntry1] : Mr. Kline is s/p T11 and T12 kyphoplasties. Surgery took place on 12/26/19. He note some mild improvement of his preoperative pain. He continues to have thoracic pain however it is somewhat more tolerable. His incision sites are healing well. The sutures were removed today. There are no signs of infection. He did mention some numbness on the left side of the abdomen however it is not painful. He is taking Percocet as needed for continued pain.

## 2020-01-06 NOTE — PHYSICAL EXAM
[FreeTextEntry1] : Alert / Oriented. \par No distress\par Gait steady\par Decrease ROM of the thoracolumbar spine \par Incisions - healing well. Sutures removed. \par Motor 5/5. \par Mild numbness in the left side of the lower abdomen.

## 2020-01-06 NOTE — ASSESSMENT
[FreeTextEntry1] : We will give Mr. Kline continued time to heal. I will see him back in 2 weeks for reassessment. I have renewed a one week supply of Percocet to take as needed. He will call barring any issues.

## 2020-01-06 NOTE — REASON FOR VISIT
[Follow-Up: _____] : a [unfilled] follow-up visit [[employer] ] : provided by [employer]   [FreeTextEntry1] : [id number] [TWNoteComboBox1] : Kazakh

## 2020-01-08 ENCOUNTER — APPOINTMENT (OUTPATIENT)
Dept: NEUROSURGERY | Facility: CLINIC | Age: 55
End: 2020-01-08

## 2020-01-22 ENCOUNTER — APPOINTMENT (OUTPATIENT)
Dept: NEUROSURGERY | Facility: CLINIC | Age: 55
End: 2020-01-22
Payer: MEDICAID

## 2020-01-22 DIAGNOSIS — Z09 ENCOUNTER FOR FOLLOW-UP EXAMINATION AFTER COMPLETED TREATMENT FOR CONDITIONS OTHER THAN MALIGNANT NEOPLASM: ICD-10-CM

## 2020-01-22 PROCEDURE — 99213 OFFICE O/P EST LOW 20 MIN: CPT

## 2020-01-22 RX ORDER — OXYCODONE AND ACETAMINOPHEN 5; 325 MG/1; MG/1
5-325 TABLET ORAL
Qty: 42 | Refills: 0 | Status: DISCONTINUED | COMMUNITY
Start: 2019-12-20 | End: 2020-01-22

## 2020-01-22 NOTE — HISTORY OF PRESENT ILLNESS
[FreeTextEntry1] : Mr. Kline presents today in follow up reporting that his back pain is getting better. He is s/p T11 and T12 kyphoplasty in 12/2019. Reports of muscular spasms in his back. He has not started physical therapy. He also states he has constipation, and numbness on the left lower quadrant in his stomach which started a week after surgery. He has not followed up with his PCP, I strongly advice him to follow up with PCP. He states he has an appt next month.

## 2020-03-24 NOTE — ED ADULT TRIAGE NOTE - TEMPERATURE IN CELSIUS (DEGREES C)
Returned patient call walk him threw the [facility] video visit for appt on 03/272020   36.7

## 2020-03-25 ENCOUNTER — APPOINTMENT (OUTPATIENT)
Dept: NEUROSURGERY | Facility: CLINIC | Age: 55
End: 2020-03-25

## 2020-10-27 ENCOUNTER — EMERGENCY (EMERGENCY)
Facility: HOSPITAL | Age: 55
LOS: 0 days | Discharge: HOME | End: 2020-10-27
Attending: EMERGENCY MEDICINE | Admitting: EMERGENCY MEDICINE
Payer: MEDICAID

## 2020-10-27 VITALS
OXYGEN SATURATION: 98 % | HEART RATE: 74 BPM | TEMPERATURE: 98 F | HEIGHT: 66 IN | SYSTOLIC BLOOD PRESSURE: 127 MMHG | RESPIRATION RATE: 18 BRPM | DIASTOLIC BLOOD PRESSURE: 62 MMHG | WEIGHT: 210.1 LBS

## 2020-10-27 DIAGNOSIS — L08.9 LOCAL INFECTION OF THE SKIN AND SUBCUTANEOUS TISSUE, UNSPECIFIED: ICD-10-CM

## 2020-10-27 DIAGNOSIS — M79.644 PAIN IN RIGHT FINGER(S): ICD-10-CM

## 2020-10-27 DIAGNOSIS — Z79.899 OTHER LONG TERM (CURRENT) DRUG THERAPY: ICD-10-CM

## 2020-10-27 PROCEDURE — 99283 EMERGENCY DEPT VISIT LOW MDM: CPT

## 2020-10-27 NOTE — ED PROVIDER NOTE - CLINICAL SUMMARY MEDICAL DECISION MAKING FREE TEXT BOX
Patient with non-traumatic  finger pain, likely early paronychia, no indication for drainage at this time, advised to soak fingers in warm water, abx prescribed, follow up with hand ortho. Strict return precautions given.  Patient verbalized understanding and is amenable with the plan.

## 2020-10-27 NOTE — ED PROVIDER NOTE - OBJECTIVE STATEMENT
56 yo M presents c/o pain to R 1st digit x1 day. Pt reports he was lifting something heavy on Saturday and developed pain yesterday. Reports painful ROM and swelling. No numbness or tingling. No other complaints at this time. 54 yo M presents c/o pain to R 1st digit x 2 days. Pt reports he was lifting something heavy on Saturday and developed pain yesterday. Reports painful ROM and swelling near nailbed. Reports constant pain, worse with touch and ROM, relieved by nothing, without radiation. No numbness or tingling. No other complaints at this time. No direct trauma. No ascending redness, warmth, fever or chills.

## 2020-10-27 NOTE — ED PROVIDER NOTE - ATTENDING CONTRIBUTION TO CARE
56 yo male PMH carpal tunnel . thoracis spine compression fx  c/o non-traumatic rt index finger pain for a few days.  Pain is mostly distal, non-radiating, without associated symptoms such as fever, paresthesias, wrist pain .  Well-appearing male, NAD, mild focal swelling on the medial side of the rt index finger near the nail, no palpable fluctuance, no skin lesions,  FROM at all joints, no lymphangitic streaking, no signs or symptoms suggestive of tenosynovitis.  Advised warm water soaks, abx and follow up with hand ortho. coffee

## 2020-10-27 NOTE — ED PROVIDER NOTE - NSFOLLOWUPINSTRUCTIONS_ED_ALL_ED_FT
PARONYCHIA - General Information           Paroniquia    LO QUE NECESITA SABER:    ¿Qué es paroniquia?Paroniquia es dixie infección e inflamación de jones pliegue ungueal provocada por bacteria o un hongo. El pliegue ungueal es la piel alrededor de las uñas. Paroniquia puede aparecer súbitamente y durar por 6 semanas o más. Es probable que usted presente la paroniquia en 1 o más dedos de la pie o la mano.    ¿Qué aumenta mi riesgo de contraer paroniquia?  •Trauma:Cualquier lesión que perfora la piel puede conllevar a dixie infección. Jones riesgo aumenta si sufre de uñas encarnadas, si usa uñas artificiales o si se muerde las uñas.      •Contacto frecuente con el agua:A menudo los trabajos que requieren que use agua frecuentemente podrían aumentar jones riesgo de tener paroniquia. Algunos ejemplos de estos trabajos son enfermeras, cocineros y cantineros. Los nadadores también corren mayor riesgo para esta enfermedad.      •Afecciones médicas:La diabetes y otras condiciones que debilitan ojnes sistema inmunológico pueden aumentar jones riesgo. Algunos ejemplos de estas condiciones son cáncer de la piel, psoriasis, VIH y lupus.      •Sustancias químicas:Contacto con jabones, detergentes y otras sustancias químicas pueden causar inflamación y llevar a la paroniquia.      •Alergias:Las alergias a ciertos alimentos, esmalte de uñas o látex pueden causar inflamación y aumentar jones riesgo de contraer paroniquia.      ¿Cuáles son los signos y síntomas de paroniquia?  •El pliegue de la uña se encuentra enrojecido, caliente, inflamado o doloroso      •Pus proveniente de la pliegue de jones uña al aplicarle presión      •Tiene dixie uña que se aparta de jones pliegue unguel e incluso podría caerse      •Cambios en el color de las uñas, por ejemplo uñas verdes      •Fiebre o escalofríos      •Uñas gruesas o ásperas, o crestas en las uñas      ¿Cómo se diagnostica la paroniquia?Jones médico le examinará las uñas y le preguntará acerca de tyler síntomas. Es probable que aplique presión sobre la uña infectada para sanjuanita si drena pus. Mandará la pus a un laboratorio para determinar cuál de los gérmenes produjo el contagio. Leida proceso se llama cultivo de líquido.    ¿Cómo se trata la paroniquia?  •Medicamento:?La vacuna Tdes dixie vacuna de refuerzo para ayudar a prevenir la difteria y el tétano. El refuerzo Td se puede administrar a adolescentes y adultos cada 10 años o para ciertas heridas y lesiones.      ?Antibióticos:Leida medicamento ayudará a combatir o evitar dixie infección provocada por dixie bacteria. Puede administrarse viviana dixie píldora, crema o pomada.      ?Esteroides:Leida medicamento facilitará la reducción de jones inflamación. Puede administrarse viviana dixie píldora, crema o pomada.      ?Medicamento antimicótico:Leida medicamento facilita la eliminación del hongo que posiblemente haya sido la causa de jones infección. Puede administrarse viviana crema o ungüento.      ?AINEs (analgésicos antiinflamatorios no esteroides):Estos medicamentos calman el dolor y la hinchazón. Los AINEs se pueden obtener sin receta médica. Consulte con jones médico cuál medicamento es el adecuado para usted. Pregunte cuánto daren y cuándo. Tómelos viviana se le indique. Cuando no se laura de la manera indicada, los medicamentos antiinflamatorios no esteroides pueden causar sangrado estomacal y problemas renales.      •Procedimientos:Usted podría necesitar dixie cirugía para drenar un absceso en jones dedo de la mano o pie. Podría ser necesario remover jones uña. El tejido infectado que circunda la uña también podría ser removido.      ¿Cuáles son los riesgos de paroniquia?Jones uña podría soltarse, deformarse o caerse por completo. Es posible que la infección forme un absceso monisha en jones uña. La infección podría propagarse a los tejidos y huesos cercanos.    ¿Cómo se puede prevenir la paroniquia?  •Evite sustancias químicas y alergenos que podrían lesionar jones piel y uñas. [place] incluye los jabones, detergentes para la ropa y productos de uñas.      •Mantenga tyler uñas limpias y secas. No remoje tyler uñas en agua. Utilice guantes de goma forrados de algodón o 2 pares de guantes de goma si usted trabaja con alimentos o agua. Los guantes ayudarán a proteger jones pliegue ungueal.      •Mantenga tyler uñas cortas. No se muerda las uñas, no se quite los padrastros, no se chupe los dedos, ni use uñas artificiales. Lleve tyler propios instrumentos de uñas cuando usted va al salón de belleza.      ¿Cómo puedo controlar los síntomas?  •Remoje jones uña:Sumerja jones uña en dixie mezcla de partes iguales de vinagre y agua 3 o 4 veces cada día. [place] ayudará a disminuir la inflamación.      •Aplíquese dixie compresa tibia:Sumerja un paño pequeño en agua tibia y colóquelo sobre jones uña. [place] ayudará a disminuir la inflamación.      •Eleve:Eleve jones uña por encima del nivel de jones corazón lo más frecuentemente. [place] va a disminuir inflamación y el dolor. Coloque jones uña sobre almohadas o cobijas para mantenerla elevada y cómoda.      •Utilice crema:Aplique crema después de lavarse las dottie. [place] prevendrá sequedad en las dottie.      ¿Cuándo jarrod comunicarme con mi médico?  •Jones uña se suelta, se deforma o se  por completo.      •Usted tiene un absceso monisha en la uña.      •Usted tiene preguntas o inquietudes acerca de jones condición o cuidado.      ¿Cuándo jarrod buscar atención inmediata?  •Usted tiene dolor severo en jones uña.      •La inflamación se extiende a jones mano o brazo.      ACUERDOS SOBRE JONES CUIDADO:    Usted tiene el derecho de ayudar a planear jones cuidado. Aprenda todo lo que pueda sobre jones condición y viviana darle tratamiento. Discuta tyler opciones de tratamiento con tyler médicos para decidir el cuidado que usted desea recibir. Usted siempre tiene el derecho de rechazar el tratamiento.

## 2020-10-27 NOTE — ED PROVIDER NOTE - PROGRESS NOTE DETAILS
The patient was given detailed return precautions and advised to return to the emergency department if any new symptoms developed, symptoms worsened or for any concerns. The patient was offered the opportunity to ask questions and verbalized that they understand the diagnosis and discharge instructions.

## 2020-10-27 NOTE — ED PROVIDER NOTE - NSFOLLOWUPCLINICS_GEN_ALL_ED_FT
[facility]  Hand  [address], [address]  [place] [address]  Phone: [phone]  Fax:   Follow Up Time:

## 2020-10-27 NOTE — ED PROVIDER NOTE - NS ED ROS FT
Constitutional: See HPI. No fever/chills.  Eyes: No visual changes, eye pain or discharge.  ENT: No hearing changes, pain, discharge or infections.  Neck: No neck pain or stiffness.  Cardiac: No chest pain, SOB or edema. No chest pain with exertion.  Respiratory: No cough or respiratory distress. No hemoptysis.   GI: No nausea, vomiting, diarrhea or abdominal pain.  : No dysuria, frequency or burning.   MS: No myalgia, muscle weakness, joint pain or back pain. (+) pain and swelling to R 1st finger  Neuro: No headache or weakness. No LOC.  Skin: No rash.  Except as documented in the HPI, all other systems are negative. Constitutional: See HPI. No fever/chills.  Cardiac: No chest pain, SOB or edema. No chest pain with exertion.  Respiratory: No cough or respiratory distress. No hemoptysis.   MS: No myalgia, muscle weakness, joint pain or back pain. (+) pain and swelling to R 1st finger  Neuro: No headache or weakness. No LOC.  Skin: No rash.  .

## 2020-10-27 NOTE — ED PROVIDER NOTE - PHYSICAL EXAMINATION
Physical Exam    Vital Signs: I have reviewed the initial vital signs.  Constitutional: well-nourished, appears stated age, no acute distress  Cardiovascular: regular rate, regular rhythm, well-perfused extremities  Respiratory: unlabored respiratory effort, clear to auscultation bilaterally  Gastrointestinal: soft, non-tender abdomen, no pulsatile mass  Musculoskeletal: supple neck, no lower extremity edema (+) R 1st digit medial cuticle point tenderness, mild swelling, no fluctuance, no erythema, FROM of digit, no fusiform swelling, no tenderness along tendon sheath. (+) full sensation of digit and motor strength 5/5   Integumentary: warm, dry, no rash  Neurologic: awake, alert, cranial nerves II-XII grossly intact, extremities’ motor and sensory functions grossly intact  Psychiatric: appropriate mood, appropriate affect Physical Exam    Vital Signs: I have reviewed the initial vital signs.  Constitutional: well-nourished, appears stated age, no acute distress  Cardiovascular: regular rate, regular rhythm, well-perfused extremities  Musculoskeletal: supple neck, no lower extremity edema (+) R 1st digit medial cuticle point tenderness, mild swelling, no fluctuance, no erythema, FROM of digit, no fusiform swelling, no tenderness along tendon sheath. (+) full sensation of digit and motor strength 5/5   Integumentary: warm, dry, no rash  Neurologic: awake, alert, cranial nerves II-XII grossly intact, extremities’ motor and sensory functions grossly intact

## 2020-10-27 NOTE — ED ADULT NURSE NOTE - OBJECTIVE STATEMENT
Pt is A and O x3, c/o right second digit finger pain and swelling since Monday. The patient stated that he picked up the bed and it hurts since. No acute distress noted.

## 2021-03-17 NOTE — ASU DISCHARGE PLAN (ADULT/PEDIATRIC) - PAIN MANAGEMENT
mom speaks only Israeli. will ask a Israeli speaking provider to call back. Katelyn Garrett, DO Prescriptions electronically submitted to pharmacy from [facility]

## 2022-01-02 ENCOUNTER — EMERGENCY (EMERGENCY)
Facility: HOSPITAL | Age: 57
LOS: 0 days | Discharge: HOME | End: 2022-01-02
Attending: EMERGENCY MEDICINE | Admitting: EMERGENCY MEDICINE
Payer: MEDICAID

## 2022-01-02 VITALS
TEMPERATURE: 98 F | SYSTOLIC BLOOD PRESSURE: 155 MMHG | OXYGEN SATURATION: 100 % | WEIGHT: 199.96 LBS | DIASTOLIC BLOOD PRESSURE: 80 MMHG | RESPIRATION RATE: 18 BRPM | HEIGHT: 66 IN | HEART RATE: 86 BPM

## 2022-01-02 DIAGNOSIS — R10.12 LEFT UPPER QUADRANT PAIN: ICD-10-CM

## 2022-01-02 DIAGNOSIS — F17.200 NICOTINE DEPENDENCE, UNSPECIFIED, UNCOMPLICATED: ICD-10-CM

## 2022-01-02 DIAGNOSIS — R10.32 LEFT LOWER QUADRANT PAIN: ICD-10-CM

## 2022-01-02 DIAGNOSIS — K29.70 GASTRITIS, UNSPECIFIED, WITHOUT BLEEDING: ICD-10-CM

## 2022-01-02 DIAGNOSIS — F10.10 ALCOHOL ABUSE, UNCOMPLICATED: ICD-10-CM

## 2022-01-02 LAB
ALBUMIN SERPL ELPH-MCNC: 4.7 G/DL — SIGNIFICANT CHANGE UP (ref 3.5–5.2)
ALP SERPL-CCNC: 79 U/L — SIGNIFICANT CHANGE UP (ref 30–115)
ALT FLD-CCNC: 35 U/L — SIGNIFICANT CHANGE UP (ref 0–41)
ANION GAP SERPL CALC-SCNC: 14 MMOL/L — SIGNIFICANT CHANGE UP (ref 7–14)
APPEARANCE UR: CLEAR — SIGNIFICANT CHANGE UP
AST SERPL-CCNC: 27 U/L — SIGNIFICANT CHANGE UP (ref 0–41)
BACTERIA # UR AUTO: NEGATIVE — SIGNIFICANT CHANGE UP
BASOPHILS # BLD AUTO: 0.04 K/UL — SIGNIFICANT CHANGE UP (ref 0–0.2)
BASOPHILS NFR BLD AUTO: 0.5 % — SIGNIFICANT CHANGE UP (ref 0–1)
BILIRUB SERPL-MCNC: 0.5 MG/DL — SIGNIFICANT CHANGE UP (ref 0.2–1.2)
BILIRUB UR-MCNC: NEGATIVE — SIGNIFICANT CHANGE UP
BUN SERPL-MCNC: 20 MG/DL — SIGNIFICANT CHANGE UP (ref 10–20)
CALCIUM SERPL-MCNC: 9.1 MG/DL — SIGNIFICANT CHANGE UP (ref 8.5–10.1)
CHLORIDE SERPL-SCNC: 104 MMOL/L — SIGNIFICANT CHANGE UP (ref 98–110)
CO2 SERPL-SCNC: 21 MMOL/L — SIGNIFICANT CHANGE UP (ref 17–32)
COLOR SPEC: YELLOW — SIGNIFICANT CHANGE UP
CREAT SERPL-MCNC: 0.8 MG/DL — SIGNIFICANT CHANGE UP (ref 0.7–1.5)
DIFF PNL FLD: ABNORMAL
EOSINOPHIL # BLD AUTO: 0.15 K/UL — SIGNIFICANT CHANGE UP (ref 0–0.7)
EOSINOPHIL NFR BLD AUTO: 1.9 % — SIGNIFICANT CHANGE UP (ref 0–8)
EPI CELLS # UR: 0 /HPF — SIGNIFICANT CHANGE UP (ref 0–5)
GLUCOSE SERPL-MCNC: 137 MG/DL — HIGH (ref 70–99)
GLUCOSE UR QL: NEGATIVE — SIGNIFICANT CHANGE UP
HCT VFR BLD CALC: 43.7 % — SIGNIFICANT CHANGE UP (ref 42–52)
HGB BLD-MCNC: 14.9 G/DL — SIGNIFICANT CHANGE UP (ref 14–18)
HYALINE CASTS # UR AUTO: 1 /LPF — SIGNIFICANT CHANGE UP (ref 0–7)
IMM GRANULOCYTES NFR BLD AUTO: 0.5 % — HIGH (ref 0.1–0.3)
KETONES UR-MCNC: SIGNIFICANT CHANGE UP
LACTATE SERPL-SCNC: 1.1 MMOL/L — SIGNIFICANT CHANGE UP (ref 0.7–2)
LEUKOCYTE ESTERASE UR-ACNC: NEGATIVE — SIGNIFICANT CHANGE UP
LIDOCAIN IGE QN: 64 U/L — HIGH (ref 7–60)
LYMPHOCYTES # BLD AUTO: 1.88 K/UL — SIGNIFICANT CHANGE UP (ref 1.2–3.4)
LYMPHOCYTES # BLD AUTO: 24.1 % — SIGNIFICANT CHANGE UP (ref 20.5–51.1)
MCHC RBC-ENTMCNC: 32.9 PG — HIGH (ref 27–31)
MCHC RBC-ENTMCNC: 34.1 G/DL — SIGNIFICANT CHANGE UP (ref 32–37)
MCV RBC AUTO: 96.5 FL — HIGH (ref 80–94)
MONOCYTES # BLD AUTO: 0.68 K/UL — HIGH (ref 0.1–0.6)
MONOCYTES NFR BLD AUTO: 8.7 % — SIGNIFICANT CHANGE UP (ref 1.7–9.3)
NEUTROPHILS # BLD AUTO: 5.02 K/UL — SIGNIFICANT CHANGE UP (ref 1.4–6.5)
NEUTROPHILS NFR BLD AUTO: 64.3 % — SIGNIFICANT CHANGE UP (ref 42.2–75.2)
NITRITE UR-MCNC: NEGATIVE — SIGNIFICANT CHANGE UP
NRBC # BLD: 0 /100 WBCS — SIGNIFICANT CHANGE UP (ref 0–0)
PH UR: 6 — SIGNIFICANT CHANGE UP (ref 5–8)
PLATELET # BLD AUTO: 170 K/UL — SIGNIFICANT CHANGE UP (ref 130–400)
POTASSIUM SERPL-MCNC: 4 MMOL/L — SIGNIFICANT CHANGE UP (ref 3.5–5)
POTASSIUM SERPL-SCNC: 4 MMOL/L — SIGNIFICANT CHANGE UP (ref 3.5–5)
PROT SERPL-MCNC: 7.3 G/DL — SIGNIFICANT CHANGE UP (ref 6–8)
PROT UR-MCNC: ABNORMAL
RBC # BLD: 4.53 M/UL — LOW (ref 4.7–6.1)
RBC # FLD: 12.6 % — SIGNIFICANT CHANGE UP (ref 11.5–14.5)
RBC CASTS # UR COMP ASSIST: 7 /HPF — HIGH (ref 0–4)
SODIUM SERPL-SCNC: 139 MMOL/L — SIGNIFICANT CHANGE UP (ref 135–146)
SP GR SPEC: 1.04 — HIGH (ref 1.01–1.03)
UROBILINOGEN FLD QL: ABNORMAL
WBC # BLD: 7.81 K/UL — SIGNIFICANT CHANGE UP (ref 4.8–10.8)
WBC # FLD AUTO: 7.81 K/UL — SIGNIFICANT CHANGE UP (ref 4.8–10.8)
WBC UR QL: 1 /HPF — SIGNIFICANT CHANGE UP (ref 0–5)

## 2022-01-02 PROCEDURE — 74177 CT ABD & PELVIS W/CONTRAST: CPT | Mod: 26,MA

## 2022-01-02 PROCEDURE — 99285 EMERGENCY DEPT VISIT HI MDM: CPT

## 2022-01-02 RX ORDER — OMEPRAZOLE 10 MG/1
1 CAPSULE, DELAYED RELEASE ORAL
Qty: 30 | Refills: 0
Start: 2022-01-02 | End: 2022-01-31

## 2022-01-02 RX ORDER — SODIUM CHLORIDE 9 MG/ML
1000 INJECTION, SOLUTION INTRAVENOUS ONCE
Refills: 0 | Status: COMPLETED | OUTPATIENT
Start: 2022-01-02 | End: 2022-01-02

## 2022-01-02 RX ADMIN — SODIUM CHLORIDE 1000 MILLILITER(S): 9 INJECTION, SOLUTION INTRAVENOUS at 09:32

## 2022-01-02 NOTE — ED PROVIDER NOTE - ATTENDING CONTRIBUTION TO CARE
56M no pmh, p/w 3 days of L sided abd pain, sharp constant nonradiating, associated w nausea and dec appetite. taking tylenol and arnica w little relief. no fever. no vdc. no dysuria, freq, hematuria. no cp, sob.     on exam, AFVSS, well steven nad, ncat, eomi, perrla, mmm, lctab, rrr nl s1s2 no mrg, abd soft +LLQ and LUQ ttp, mild , no rebound or rigidity, no cvat, nd, aaox3, no focal deficits, no le edema or calf ttp,     a/p; abd pain, will do labs, ua, ct, pain control re-eval

## 2022-01-02 NOTE — ED PROVIDER NOTE - OBJECTIVE STATEMENT
Pt with no PMH c/o 3 days of left sided abd pain. no NVDC or urinary symptoms. Denies fever or chills. No relief with topical arnica cream. (+)smoker (+)ETOH

## 2022-01-02 NOTE — ED PROVIDER NOTE - CARE PROVIDER_API CALL
Del Alexander)  Gastroenterology; Internal Medicine  [address] [place] [address]  Phone: [phone]  Fax: [phone]  Follow Up Time: 4-6 Days

## 2022-01-02 NOTE — ED PROVIDER NOTE - NS ED ROS FT
CONST: No fever, chills or bodyaches  EYES: No pain, redness, drainage or visual changes.  ENT: No ear pain or discharge, nasal discharge or congestion. No sore throat  CARD: No chest pain, palpitations  RESP: No SOB, cough, hemoptysis. No hx of asthma or COPD  GI: (+)abdominal pain, No N/V/D  MS: No joint pain, back pain or extremity pain/injury  SKIN: No rashes  NEURO: No headache, dizziness, paresthesias or LOC

## 2022-01-02 NOTE — ED PROVIDER NOTE - PHYSICAL EXAMINATION
CONST: Well appearing in NAD  EYES: PERRL, EOMI, Sclera and conjunctiva clear.   NECK: Non-tender, no meningeal signs  CARD: Normal S1 S2; Normal rate and rhythm  RESP: Equal BS B/L, No wheezes, rhonchi or rales. No distress  GI: Soft, non-tender, non-distended. No CVAT  MS: Normal ROM in all extremities. No midline spinal tenderness.  SKIN: Warm, dry, no acute rashes. Good turgor  NEURO: A&Ox3, No focal deficits. Strength 5/5 with no sensory deficits. Steady gait

## 2022-01-02 NOTE — ED PROVIDER NOTE - NSFOLLOWUPINSTRUCTIONS_ED_ALL_ED_FT
Abdominal Pain    Many things can cause abdominal pain. Many times, abdominal pain is not caused by a disease and will improve without treatment. Your health care provider will do a physical exam to determine if there is a dangerous cause of your pain; blood tests and imaging may help determine the cause of your pain. However, in many cases, no cause may be found and you may need further testing as an outpatient. Monitor your abdominal pain for any changes.     SEEK IMMEDIATE MEDICAL CARE IF YOU HAVE ANY OF THE FOLLOWING SYMPTOMS: worsening abdominal pain, uncontrollable vomiting, profuse diarrhea, inability to have bowel movements or pass gas, black or bloody stools, fever accompanying chest pain or back pain, or fainting. These symptoms may represent a serious problem that is an emergency. Do not wait to see if the symptoms will go away. Get medical help right away. Call 911 and do not drive yourself to the hospital.  Gastritis    Gastritis is soreness and swelling (inflammation) of the lining of the stomach. Gastritis can develop as a sudden onset (acute) or long-term (chronic) condition. If gastritis is not treated, it can lead to stomach bleeding and ulcers. Causes include viral and bacterial infections, excessive alcohol consumption, tobacco use, or certain medications. Symptoms include nausea, vomiting, or abdominal pain or burning especially after eating. Avoid foods or drinks that make your symptoms worse such as caffeine, chocolate, spicy foods, acidic foods, or alcohol.    SEEK IMMEDIATE MEDICAL CARE IF YOU HAVE ANY OF THE FOLLOWING SYMPTOMS: black or bloody stools, blood or coffee-ground-colored vomitus, worsening abdominal pain, fever, or inability to keep fluids down.

## 2022-01-02 NOTE — ED PROVIDER NOTE - PATIENT PORTAL LINK FT
You can access the FollowMyHealth Patient Portal offered by [facility] by registering at the following website: [email or web address]. By joining [email or web address]’s FollowMyHealth portal, you will also be able to view your health information using other applications (apps) compatible with our system.

## 2022-01-13 ENCOUNTER — EMERGENCY (EMERGENCY)
Facility: HOSPITAL | Age: 57
LOS: 0 days | Discharge: HOME | End: 2022-01-13
Attending: EMERGENCY MEDICINE | Admitting: EMERGENCY MEDICINE
Payer: MEDICAID

## 2022-01-13 VITALS
RESPIRATION RATE: 17 BRPM | TEMPERATURE: 98 F | OXYGEN SATURATION: 99 % | SYSTOLIC BLOOD PRESSURE: 117 MMHG | DIASTOLIC BLOOD PRESSURE: 57 MMHG | HEART RATE: 80 BPM

## 2022-01-13 VITALS
HEIGHT: 66 IN | HEART RATE: 97 BPM | TEMPERATURE: 99 F | SYSTOLIC BLOOD PRESSURE: 135 MMHG | DIASTOLIC BLOOD PRESSURE: 87 MMHG | OXYGEN SATURATION: 96 % | RESPIRATION RATE: 20 BRPM

## 2022-01-13 DIAGNOSIS — R10.9 UNSPECIFIED ABDOMINAL PAIN: ICD-10-CM

## 2022-01-13 DIAGNOSIS — L50.9 URTICARIA, UNSPECIFIED: ICD-10-CM

## 2022-01-13 DIAGNOSIS — R10.13 EPIGASTRIC PAIN: ICD-10-CM

## 2022-01-13 DIAGNOSIS — R10.12 LEFT UPPER QUADRANT PAIN: ICD-10-CM

## 2022-01-13 DIAGNOSIS — T78.40XA ALLERGY, UNSPECIFIED, INITIAL ENCOUNTER: ICD-10-CM

## 2022-01-13 DIAGNOSIS — T39.395A ADVERSE EFFECT OF OTHER NONSTEROIDAL ANTI-INFLAMMATORY DRUGS [NSAID], INITIAL ENCOUNTER: ICD-10-CM

## 2022-01-13 DIAGNOSIS — U07.1 COVID-19: ICD-10-CM

## 2022-01-13 DIAGNOSIS — J12.82 PNEUMONIA DUE TO CORONAVIRUS DISEASE 2019: ICD-10-CM
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
## 2022-01-13 DIAGNOSIS — K13.0 DISEASES OF LIPS: ICD-10-CM
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
## 2022-01-13 LAB
ALBUMIN SERPL ELPH-MCNC: 4.6 G/DL — SIGNIFICANT CHANGE UP (ref 3.5–5.2)
ALP SERPL-CCNC: 79 U/L — SIGNIFICANT CHANGE UP (ref 30–115)
ALT FLD-CCNC: 39 U/L — SIGNIFICANT CHANGE UP (ref 0–41)
ANION GAP SERPL CALC-SCNC: 16 MMOL/L — HIGH (ref 7–14)
APPEARANCE UR: CLEAR — SIGNIFICANT CHANGE UP
AST SERPL-CCNC: 30 U/L — SIGNIFICANT CHANGE UP (ref 0–41)
BACTERIA # UR AUTO: NEGATIVE — SIGNIFICANT CHANGE UP
BASOPHILS # BLD AUTO: 0.02 K/UL — SIGNIFICANT CHANGE UP (ref 0–0.2)
BASOPHILS NFR BLD AUTO: 0.2 % — SIGNIFICANT CHANGE UP (ref 0–1)
BILIRUB SERPL-MCNC: 0.7 MG/DL — SIGNIFICANT CHANGE UP (ref 0.2–1.2)
BILIRUB UR-MCNC: NEGATIVE — SIGNIFICANT CHANGE UP
BUN SERPL-MCNC: 22 MG/DL — HIGH (ref 10–20)
CALCIUM SERPL-MCNC: 9.3 MG/DL — SIGNIFICANT CHANGE UP (ref 8.5–10.1)
CHLORIDE SERPL-SCNC: 103 MMOL/L — SIGNIFICANT CHANGE UP (ref 98–110)
CO2 SERPL-SCNC: 22 MMOL/L — SIGNIFICANT CHANGE UP (ref 17–32)
COLOR SPEC: YELLOW — SIGNIFICANT CHANGE UP
CREAT SERPL-MCNC: 0.8 MG/DL — SIGNIFICANT CHANGE UP (ref 0.7–1.5)
DIFF PNL FLD: ABNORMAL
EOSINOPHIL # BLD AUTO: 0.03 K/UL — SIGNIFICANT CHANGE UP (ref 0–0.7)
EOSINOPHIL NFR BLD AUTO: 0.4 % — SIGNIFICANT CHANGE UP (ref 0–8)
EPI CELLS # UR: 0 /HPF — SIGNIFICANT CHANGE UP (ref 0–5)
GLUCOSE SERPL-MCNC: 196 MG/DL — HIGH (ref 70–99)
GLUCOSE UR QL: NEGATIVE — SIGNIFICANT CHANGE UP
HCT VFR BLD CALC: 44.4 % — SIGNIFICANT CHANGE UP (ref 42–52)
HGB BLD-MCNC: 15.3 G/DL — SIGNIFICANT CHANGE UP (ref 14–18)
HYALINE CASTS # UR AUTO: 4 /LPF — SIGNIFICANT CHANGE UP (ref 0–7)
IMM GRANULOCYTES NFR BLD AUTO: 0.7 % — HIGH (ref 0.1–0.3)
KETONES UR-MCNC: NEGATIVE — SIGNIFICANT CHANGE UP
LACTATE SERPL-SCNC: 2.2 MMOL/L — HIGH (ref 0.7–2)
LEUKOCYTE ESTERASE UR-ACNC: NEGATIVE — SIGNIFICANT CHANGE UP
LIDOCAIN IGE QN: 27 U/L — SIGNIFICANT CHANGE UP (ref 7–60)
LYMPHOCYTES # BLD AUTO: 1.06 K/UL — LOW (ref 1.2–3.4)
LYMPHOCYTES # BLD AUTO: 12.6 % — LOW (ref 20.5–51.1)
MCHC RBC-ENTMCNC: 32.9 PG — HIGH (ref 27–31)
MCHC RBC-ENTMCNC: 34.5 G/DL — SIGNIFICANT CHANGE UP (ref 32–37)
MCV RBC AUTO: 95.5 FL — HIGH (ref 80–94)
MONOCYTES # BLD AUTO: 0.56 K/UL — SIGNIFICANT CHANGE UP (ref 0.1–0.6)
MONOCYTES NFR BLD AUTO: 6.7 % — SIGNIFICANT CHANGE UP (ref 1.7–9.3)
NEUTROPHILS # BLD AUTO: 6.67 K/UL — HIGH (ref 1.4–6.5)
NEUTROPHILS NFR BLD AUTO: 79.4 % — HIGH (ref 42.2–75.2)
NITRITE UR-MCNC: NEGATIVE — SIGNIFICANT CHANGE UP
NRBC # BLD: 0 /100 WBCS — SIGNIFICANT CHANGE UP (ref 0–0)
PH UR: 6 — SIGNIFICANT CHANGE UP (ref 5–8)
PLATELET # BLD AUTO: 183 K/UL — SIGNIFICANT CHANGE UP (ref 130–400)
POTASSIUM SERPL-MCNC: 4.6 MMOL/L — SIGNIFICANT CHANGE UP (ref 3.5–5)
POTASSIUM SERPL-SCNC: 4.6 MMOL/L — SIGNIFICANT CHANGE UP (ref 3.5–5)
PROT SERPL-MCNC: 7.2 G/DL — SIGNIFICANT CHANGE UP (ref 6–8)
PROT UR-MCNC: ABNORMAL
RBC # BLD: 4.65 M/UL — LOW (ref 4.7–6.1)
RBC # FLD: 12.6 % — SIGNIFICANT CHANGE UP (ref 11.5–14.5)
RBC CASTS # UR COMP ASSIST: 4 /HPF — SIGNIFICANT CHANGE UP (ref 0–4)
SARS-COV-2 RNA SPEC QL NAA+PROBE: DETECTED
SODIUM SERPL-SCNC: 141 MMOL/L — SIGNIFICANT CHANGE UP (ref 135–146)
SP GR SPEC: >1.05 (ref 1.01–1.03)
UROBILINOGEN FLD QL: SIGNIFICANT CHANGE UP
WBC # BLD: 8.4 K/UL — SIGNIFICANT CHANGE UP (ref 4.8–10.8)
WBC # FLD AUTO: 8.4 K/UL — SIGNIFICANT CHANGE UP (ref 4.8–10.8)
WBC UR QL: 1 /HPF — SIGNIFICANT CHANGE UP (ref 0–5)

## 2022-01-13 PROCEDURE — 74177 CT ABD & PELVIS W/CONTRAST: CPT | Mod: 26,MA

## 2022-01-13 PROCEDURE — 99285 EMERGENCY DEPT VISIT HI MDM: CPT

## 2022-01-13 PROCEDURE — 71046 X-RAY EXAM CHEST 2 VIEWS: CPT | Mod: 26

## 2022-01-13 PROCEDURE — 93010 ELECTROCARDIOGRAM REPORT: CPT

## 2022-01-13 RX ORDER — EPINEPHRINE 0.3 MG/.3ML
0.3 INJECTION INTRAMUSCULAR; SUBCUTANEOUS
Qty: 1 | Refills: 0
Start: 2022-01-13 | End: 2022-01-13

## 2022-01-13 RX ORDER — DEXAMETHASONE 0.5 MG/5ML
10 ELIXIR ORAL ONCE
Refills: 0 | Status: COMPLETED | OUTPATIENT
Start: 2022-01-13 | End: 2022-01-13

## 2022-01-13 RX ORDER — FAMOTIDINE 10 MG/ML
20 INJECTION INTRAVENOUS ONCE
Refills: 0 | Status: COMPLETED | OUTPATIENT
Start: 2022-01-13 | End: 2022-01-13

## 2022-01-13 RX ORDER — SODIUM CHLORIDE 9 MG/ML
1000 INJECTION, SOLUTION INTRAVENOUS ONCE
Refills: 0 | Status: COMPLETED | OUTPATIENT
Start: 2022-01-13 | End: 2022-01-13

## 2022-01-13 RX ORDER — FAMOTIDINE 10 MG/ML
1 INJECTION INTRAVENOUS
Qty: 7 | Refills: 0
Start: 2022-01-13 | End: 2022-01-19

## 2022-01-13 RX ORDER — DIPHENHYDRAMINE HCL 50 MG
50 CAPSULE ORAL ONCE
Refills: 0 | Status: COMPLETED | OUTPATIENT
Start: 2022-01-13 | End: 2022-01-13

## 2022-01-13 RX ORDER — CIPROFLOXACIN LACTATE 400MG/40ML
1 VIAL (ML) INTRAVENOUS
Qty: 5 | Refills: 0
Start: 2022-01-13 | End: 2022-01-17

## 2022-01-13 RX ORDER — SUCRALFATE 1 G
1 TABLET ORAL ONCE
Refills: 0 | Status: COMPLETED | OUTPATIENT
Start: 2022-01-13 | End: 2022-01-13

## 2022-01-13 RX ORDER — DIPHENHYDRAMINE HYDROCHLORIDE AND LIDOCAINE HYDROCHLORIDE AND ALUMINUM HYDROXIDE AND MAGNESIUM HYDRO
10 KIT ONCE
Refills: 0 | Status: COMPLETED | OUTPATIENT
Start: 2022-01-13 | End: 2022-01-13

## 2022-01-13 RX ADMIN — SODIUM CHLORIDE 1000 MILLILITER(S): 9 INJECTION, SOLUTION INTRAVENOUS at 08:32

## 2022-01-13 RX ADMIN — Medication 50 MILLIGRAM(S): at 08:30

## 2022-01-13 RX ADMIN — FAMOTIDINE 20 MILLIGRAM(S): 10 INJECTION INTRAVENOUS at 08:31

## 2022-01-13 RX ADMIN — DIPHENHYDRAMINE HYDROCHLORIDE AND LIDOCAINE HYDROCHLORIDE AND ALUMINUM HYDROXIDE AND MAGNESIUM HYDRO 10 MILLILITER(S): KIT at 08:44

## 2022-01-13 RX ADMIN — Medication 1 GRAM(S): at 08:43

## 2022-01-13 RX ADMIN — Medication 102 MILLIGRAM(S): at 08:43

## 2022-01-13 NOTE — ED PROVIDER NOTE - CARE PROVIDER_API CALL
Jaime Franklin (DO)  Gastroenterology  [address] [place] [address]  Phone: [phone]  Fax: [phone]  Follow Up Time: 1-3 Days    Renetta Delatorre)  Allergy and Immunology; Internal Medicine  [address] [place] [address]  Phone: [phone]  Fax: [phone]  Follow Up Time: 1-3 Days

## 2022-01-13 NOTE — ED PROVIDER NOTE - PROGRESS NOTE DETAILS
Miles: pt still having abdominal pain after receiving medications, will get CT scan TA: CT showing ED Attending BRANDEE Hinojosa  pt feeling better, no lip swelling, no hives/rash, aware of not taking arcoxia and [id number] any triggering factors, meds sent to pharmacy including epi pen, aware of when and how to use and follow up with allergist, pt also aware of ct findings showing" New patchy opacity in the partially imaged right middle lobe, likely   infectious in nature." as well as  "New dilated loop of small bowel in the left upper quadrant, nonspecific  in nature. Interval follow-up is recommended." pt placed in teams for close follow and  used so that pt is understands importance of this, ekg with normal qtc, levaquin sent to pharmacy, pt aware of signs and symptoms to return for, will follow up as discussed.     No definite evidence of free air or obstruction at this time.

## 2022-01-13 NOTE — ED PROVIDER NOTE - CLINICAL SUMMARY MEDICAL DECISION MAKING FREE TEXT BOX
56 year old PMH here for allergic reaction after taking arcoxia (one dose). C/o lip swelling and pruritish rash on arms, chest, back. On exam, patient has lip swelling, no rash. Airway intact, no tongue swelling. No wheezing. +epigastric ttp, w/o rebound/guarding. On labs, no leukocytosis, +lactate 2.2. UA negative for infection. CT A/P done showing new patchy opacity, likely pneumonia and dilated loops of bowel. Patient treated with decadron, pepcid, benadyrl, sucralfate, 1L fluids. Upon reassessment, pt feeling better; lip swelling improving. EKG showing QTc 415. Levaquin, epi pen sent to pharmacy. Placed patient's information in medicine clinic follow up in Teams. 56 year old PMH here for allergic reaction after taking arcoxia (one dose). C/o lip swelling and pruritish rash on arms, chest, back. On exam, patient has lip swelling, no rash. Airway intact, no tongue swelling. No wheezing. +epigastric ttp, w/o rebound/guarding. On labs, no leukocytosis, +lactate 2.2. UA negative for infection. CT A/P done showing new patchy opacity, likely pneumonia and dilated loops of bowel. Patient treated with decadron, pepcid, benadyrl, sucralfate, 1L fluids. Upon reassessment, pt feeling better; lip swelling improving. EKG showing QTc 415. Levaquin, epi pen sent to pharmacy. Placed patient's information in medicine clinic follow up in Teams.    Patient is a good candidate to attempt outpatient management. Supportive care and home care discussed in detail. Patient aware they may have to return for re-evaluation  if outpatient treatment fails. Strict return precautions discussed.

## 2022-01-13 NOTE — ED PROVIDER NOTE - PATIENT PORTAL LINK FT
You can access the FollowMyHealth Patient Portal offered by [facility] by registering at the following website: [email or web address]. By joining [employer]’s FollowMyHealth portal, you will also be able to view your health information using other applications (apps) compatible with our system.

## 2022-01-13 NOTE — ED PROVIDER NOTE - PROVIDER TOKENS
PROVIDER:[TOKEN:[[id number]],FOLLOWUP:[1-3 Days]],PROVIDER:[TOKEN:[[id number]],FOLLOWUP:[1-3 Days]]

## 2022-01-13 NOTE — ED PROVIDER NOTE - NSFOLLOWUPINSTRUCTIONS_ED_ALL_ED_FT
General Allergic Reaction    WHAT YOU NEED TO KNOW:    An allergic reaction is your body's response to an allergen. Allergens include medicines, food, insect stings, animal dander, mold, latex, chemicals, and dust mites. Pollen from trees, grass, and weeds can also cause an allergic reaction. An allergic reaction can range from mild to severe.    DISCHARGE INSTRUCTIONS:    Call 911 for signs or symptoms of anaphylaxis, such as trouble breathing, swelling in your mouth or throat, or wheezing. You may also have itching, a rash, hives, or feel like you are going to faint.    Return to the emergency department if:     You have a skin rash, hives, swelling, or itching that is starting to get worse.      Your throat tightens, or your lips or tongue swell.      You have trouble swallowing or speaking.      You have worsening nausea, diarrhea, or abdominal cramps, or you are vomiting.      You have chest pain or tightness.    Contact your healthcare provider if:     You have questions or concerns about your condition or care.        Medicines: You may need any of the following:     Medicines may be given to relieve certain allergy symptoms such as itching, sneezing, and swelling. You may take them as a pill or use drops in your nose or eyes. Topical treatments may be given to put directly on your skin to help decrease itching or swelling.      Epinephrine may be prescribed if you are at risk for anaphylaxis. This is a severe allergic reaction that can be life-threatening. Your healthcare provider will tell you if you need to keep epinephrine with you. You will be taught when and how to use it.      Take your medicine as directed. Contact your healthcare provider if you think your medicine is not helping or if you have side effects. Tell him of her if you are allergic to any medicine. Keep a list of the medicines, vitamins, and herbs you take. Include the amounts, and when and why you take them. Bring the list or the pill bottles to follow-up visits. Carry your medicine list with you in case of an emergency.    Follow up with your healthcare provider as directed: Write down your questions so you remember to ask them during your visits.     Manage your symptoms:     Avoid allergens. You may need to have allergy testing with your healthcare provider or a specialist to find your allergens.      Use cold compresses on your skin or eyes. This will help soothe skin or eyes affected by the allergic reaction. You can make a cold compress by soaking a washcloth in cool water. Wring out the extra water before you apply the washcloth.      Rinse your nasal passages with a saline solution. Daily rinsing may help clear allergens out of your nose. Use distilled water if possible. You can also boil tap water and then let it cool before you use it. Do not use tap water without boiling it first.      Do not smoke. Nicotine and other chemicals in cigarettes and cigars can make an allergic reaction worse, and can also cause lung damage. Ask your healthcare provider for information if you currently smoke and need help to quit. E-cigarettes or smokeless tobacco still contain nicotine. Talk to your healthcare provider before you use these products.       Abdominal Pain    Many things can cause abdominal pain. Usually, abdominal pain is not caused by a disease and will improve without treatment. Your health care provider will do a physical exam and possibly order blood tests and imaging to help determine the seriousness of your pain. However, in many cases, no cause may be found and you may need further testing as an outpatient. Monitor your abdominal pain for any changes.     SEEK IMMEDIATE MEDICAL CARE IF YOU HAVE THE FOLLOWING SYMPTOMS: worsening abdominal pain, vomiting, diarrhea, inability to have bowel movements or pass gas, black or bloody stool, fever accompanying chest pain or back pain, or dizziness/lightheadedness.    Pneumonia    Pneumonia is an infection of the lungs. Pneumonia may be caused by bacteria, viruses, or funguses. Symptoms include coughing, fever, chest pain when breathing deeply or coughing, shortness of breath, fatigue, or muscle aches. Pneumonia can be diagnosed with a medical history and physical exam, as well as other tests which may include a chest X-ray. If you were prescribed an antibiotic medicine, take it as told by your health care provider and do not stop taking the antibiotic even if you start to feel better. Do not use tobacco products, including cigarettes, chewing tobacco, and e-cigarettes.    SEEK IMMEDIATE MEDICAL CARE IF YOU HAVE THE FOLLOWING SYMPTOMS: worsening shortness of breath, worsening chest pain, coughing up blood, change in mental status, lightheadedness/dizziness.

## 2022-01-13 NOTE — ED PROVIDER NOTE - NS ED ROS FT
Constitutional: No fevers, chills, or malaise.  HEENT: No headache, visual changes   Cardiac:  No chest pain, SOB  Respiratory:  No cough, respiratory distress, or hemoptysis.  GI:  Reports abdominal pain. No nausea, vomiting, or diarrhea.   :  No dysuria, frequency, or urgency.  MS:  No myalgia, muscle weakness, joint pain or back pain.  Neuro:  No dizziness, LOC, paralysis, or N/T.  Skin:  Reports rash   Endocrine: No polyuria, polyphagia, or polydipsia.

## 2022-01-13 NOTE — ED PROVIDER NOTE - ATTENDING CONTRIBUTION TO CARE
55 y/o m no pmhx presents with allergic reaction x 1 day, reports was seen in ed on 1/ 2 for abd pain had lab work and imaging that was negative, has been taken omeprazole with no relief, started arcoxia one dose yesterday and shortly experienced diffuse pruritic rash with lip swelling since 8pm last night, shortly after taken med, persisted this morning so came to ed. pt reports abd pain was present prior, starts periumbilical and radiated to epigastric area, dull, constant, no alleviating factors, worse with palpation, moderate in intensity.  denies fever, chills, n/v, cp, sob, pleuritic chest pain, palpitations, diaphoresis, cough, drooling/secretions, trismus, neck swelling, neck stiffness, muffled/change in voice, dysphagia, odonoyphagia, drainage, trauma, weakness, numbness/tingling, , diarrhea, constipation, urinary symptoms, ha/lh/dizziness, involvement of palms or soles.    on exam: non toxic appearing pt sitting on stretcher speaking full sentences, lower lip swelling present, no rash noted, excoriation from itching present. no signs of infection, no rash to mucous membranes, palms or soles, no petechiae, (-) Nikolsky sign, no bleeding, no target lesions,  no central clearing, no bleeding. no oropharyngeal edema, uvula midline, no neck swelling- supple, non-tender, RRR, radial pulses 2/4 b/l, ctabl w/ breath sounds present b/l, no wheezing or crackles, good air exchange, good respiratory effort, no accessory muscle use, no tachypnea, no stridor, bs present throughout all 4 quadrants, soft nd, tender to periumbical area, no r/g, no hepatolienomegaly, AAOx3. Cn II-XII intact, No focal deficits.

## 2022-01-13 NOTE — ED PROVIDER NOTE - CARE PLAN
Principal Discharge DX:	Allergic reaction  Secondary Diagnosis:	Abdominal pain   1 Principal Discharge DX:	Allergic reaction  Assessment and plan of treatment:	Plan: Decadron, benadryl, pepcid, ivf, labs, urine, imaging, reassess.  Secondary Diagnosis:	Abdominal pain   Principal Discharge DX:	Allergic reaction  Assessment and plan of treatment:	Plan: Decadron, benadryl, pepcid, ivf, labs, urine, imaging, reassess.  Secondary Diagnosis:	Abdominal pain  Secondary Diagnosis:	Pneumonia

## 2022-01-13 NOTE — ED PROVIDER NOTE - PHYSICAL EXAMINATION
GENERAL: NAD   SKIN: Diffuse papular urticaria noted over torso and extremities.   HEAD: Normocephalic; atraumatic.  EYES: PERRLA, EOMI, no conjunctival erythema  ENT: No nasal discharge; airway clear. No uvula edema and oropharynx WNL  NECK: No stridor on auscultation   CARD: S1, S2 normal; no murmurs, gallops, or rubs. Regular rate and rhythm.   RESP: LCTAB; No wheezes, rales, rhonchi, or stridor.  ABD: LUQ and epigastric mild TTP. Abdomen soft and nondistended  BACK: no CVA tenderness   NEURO: Alert, oriented, grossly unremarkable  PSYCH: Cooperative, appropriate. GENERAL: NAD   SKIN: Diffuse papular urticaria noted over torso and extremities.   HEAD: Normocephalic; atraumatic.  EYES: PERRLA, EOMI, no conjunctival erythema  ENT: Lip angioedema and eyelid swelling noted. Tongue WNL, airway clear. No uvula edema and oropharynx WNL  NECK: No stridor on auscultation   CARD: S1, S2 normal; no murmurs, gallops, or rubs. Regular rate and rhythm.   RESP: LCTAB; No wheezes, rales, rhonchi, or stridor.  ABD: LUQ and epigastric mild TTP. Abdomen soft and nondistended  BACK: no CVA tenderness   NEURO: Alert, oriented, grossly unremarkable  PSYCH: Cooperative, appropriate.

## 2022-01-13 NOTE — ED ADULT NURSE NOTE - NSFALLRSKASSESASSIST_ED_ALL_ED
Refill request received via fax, entered and sent to provider’s office to address.     Prior Auth: No    
Refills on pt's test strips were sent to [facility] pharmacy. Per JACE Bertrand PA-C  
no

## 2022-01-13 NOTE — ED ADULT NURSE NOTE - CHIEF COMPLAINT QUOTE
"I have pain in my stomach". pt states he took a medication for pain in his stomach yesterday, and developed lip swelling and itching to his skin since yesterday after taking "Arcoxia".   no s/s difficulty breathing or respiratory distress. able to tolerate PO secretions.

## 2022-01-13 NOTE — ED ADULT TRIAGE NOTE - CHIEF COMPLAINT QUOTE
"I have pain in my stomach". pt states he took a medication for pain in his stomach yesterday, and developed lip swelling and itching to his skin since yesterday after taking "Arcoxia". "I have pain in my stomach". pt states he took a medication for pain in his stomach yesterday, and developed lip swelling and itching to his skin since yesterday after taking "Arcoxia".   no s/s difficulty breathing or respiratory distress. able to tolerate PO secretions.

## 2022-01-13 NOTE — ED PROVIDER NOTE - OBJECTIVE STATEMENT
57 yo male w/ no PMH presents for allergic reaction x 1 day and abdominal pain x 2 weeks.  Was seen in ED on 1/2 for the abdominal pain, had CT abdomen/pelvis, labs, and UA performed which were WNL.  No inciting event or trauma, no alleviating/provoking factors, sharp crampy pain in LUQ and epigastric region, no radiation, denies having had before.  Was unable to f/u with GI outpt, yesterday took Arcoxia (etoricoxib) which he got in [place], shortly after started having lip swelling and itchiness, this morning woke up and still had lip swelling, upper eyelid swelling, and pruritic rash diffusely over body so came into ED.  No fevers, difficulty swallowing, throat itchiness/tightness, SOB, N/V, diarrhea, urianry sxs. No prior hx of allergic reactions.  Drinks a couple beers a day. 57 yo male w/ no PMH presents for allergic reaction x 1 day and abdominal pain x 2 weeks.  Was seen in ED on 1/2 for the abdominal pain, had CT abdomen/pelvis, labs, and UA performed which were WNL.  No inciting event or trauma, no alleviating/provoking factors, sharp crampy pain in LUQ and epigastric region, no radiation, denies having had before.  Was unable to f/u with GI outpt, yesterday took Arcoxia (etoricoxib) which he got in [place], shortly after started having lip swelling and itchiness, this morning woke up and still had lip swelling, upper eyelid swelling, and pruritic rash diffusely over body so came into ED.  No fevers, difficulty swallowing, throat itchiness/tightness, SOB, N/V, diarrhea, urianry sxs. No prior hx of allergic reactions.  Drinks a couple beers a day.  [id number] used.

## 2022-01-13 NOTE — ED PROVIDER NOTE - NSFOLLOWUPCLINICS_GEN_ALL_ED_FT
[facility]  Medicine  [address], [place]   Phone: [phone]  Fax:   Follow Up Time: 1-3 Days

## 2022-01-14 LAB
CULTURE RESULTS: SIGNIFICANT CHANGE UP
SPECIMEN SOURCE: SIGNIFICANT CHANGE UP

## 2022-01-19 ENCOUNTER — EMERGENCY (EMERGENCY)
Facility: HOSPITAL | Age: 57
LOS: 0 days | Discharge: HOME | End: 2022-01-19
Attending: EMERGENCY MEDICINE | Admitting: EMERGENCY MEDICINE
Payer: MEDICAID

## 2022-01-19 VITALS
RESPIRATION RATE: 18 BRPM | DIASTOLIC BLOOD PRESSURE: 75 MMHG | HEART RATE: 69 BPM | SYSTOLIC BLOOD PRESSURE: 134 MMHG | OXYGEN SATURATION: 96 % | TEMPERATURE: 99 F

## 2022-01-19 VITALS
RESPIRATION RATE: 16 BRPM | HEART RATE: 96 BPM | OXYGEN SATURATION: 96 % | SYSTOLIC BLOOD PRESSURE: 156 MMHG | DIASTOLIC BLOOD PRESSURE: 71 MMHG | TEMPERATURE: 98 F | HEIGHT: 66 IN

## 2022-01-19 DIAGNOSIS — R10.9 UNSPECIFIED ABDOMINAL PAIN: ICD-10-CM

## 2022-01-19 DIAGNOSIS — K76.0 FATTY (CHANGE OF) LIVER, NOT ELSEWHERE CLASSIFIED: ICD-10-CM

## 2022-01-19 DIAGNOSIS — F17.200 NICOTINE DEPENDENCE, UNSPECIFIED, UNCOMPLICATED: ICD-10-CM

## 2022-01-19 DIAGNOSIS — U07.1 COVID-19: ICD-10-CM

## 2022-01-19 LAB
ALBUMIN SERPL ELPH-MCNC: 4.7 G/DL — SIGNIFICANT CHANGE UP (ref 3.5–5.2)
ALP SERPL-CCNC: 77 U/L — SIGNIFICANT CHANGE UP (ref 30–115)
ALT FLD-CCNC: 48 U/L — HIGH (ref 0–41)
ANION GAP SERPL CALC-SCNC: 14 MMOL/L — SIGNIFICANT CHANGE UP (ref 7–14)
APPEARANCE UR: CLEAR — SIGNIFICANT CHANGE UP
AST SERPL-CCNC: 26 U/L — SIGNIFICANT CHANGE UP (ref 0–41)
BASOPHILS # BLD AUTO: 0.04 K/UL — SIGNIFICANT CHANGE UP (ref 0–0.2)
BASOPHILS NFR BLD AUTO: 0.4 % — SIGNIFICANT CHANGE UP (ref 0–1)
BILIRUB DIRECT SERPL-MCNC: <0.2 MG/DL — SIGNIFICANT CHANGE UP (ref 0–0.3)
BILIRUB INDIRECT FLD-MCNC: >0.3 MG/DL — SIGNIFICANT CHANGE UP (ref 0.2–1.2)
BILIRUB SERPL-MCNC: 0.5 MG/DL — SIGNIFICANT CHANGE UP (ref 0.2–1.2)
BILIRUB UR-MCNC: NEGATIVE — SIGNIFICANT CHANGE UP
BUN SERPL-MCNC: 13 MG/DL — SIGNIFICANT CHANGE UP (ref 10–20)
CALCIUM SERPL-MCNC: 9.3 MG/DL — SIGNIFICANT CHANGE UP (ref 8.5–10.1)
CHLORIDE SERPL-SCNC: 106 MMOL/L — SIGNIFICANT CHANGE UP (ref 98–110)
CO2 SERPL-SCNC: 21 MMOL/L — SIGNIFICANT CHANGE UP (ref 17–32)
COLOR SPEC: YELLOW — SIGNIFICANT CHANGE UP
CREAT SERPL-MCNC: 0.7 MG/DL — SIGNIFICANT CHANGE UP (ref 0.7–1.5)
DIFF PNL FLD: SIGNIFICANT CHANGE UP
EOSINOPHIL # BLD AUTO: 0.13 K/UL — SIGNIFICANT CHANGE UP (ref 0–0.7)
EOSINOPHIL NFR BLD AUTO: 1.4 % — SIGNIFICANT CHANGE UP (ref 0–8)
GLUCOSE SERPL-MCNC: 135 MG/DL — HIGH (ref 70–99)
GLUCOSE UR QL: NEGATIVE — SIGNIFICANT CHANGE UP
HCT VFR BLD CALC: 45.3 % — SIGNIFICANT CHANGE UP (ref 42–52)
HGB BLD-MCNC: 15.3 G/DL — SIGNIFICANT CHANGE UP (ref 14–18)
IMM GRANULOCYTES NFR BLD AUTO: 1.5 % — HIGH (ref 0.1–0.3)
KETONES UR-MCNC: NEGATIVE — SIGNIFICANT CHANGE UP
LACTATE SERPL-SCNC: 1.2 MMOL/L — SIGNIFICANT CHANGE UP (ref 0.7–2)
LEUKOCYTE ESTERASE UR-ACNC: NEGATIVE — SIGNIFICANT CHANGE UP
LIDOCAIN IGE QN: 24 U/L — SIGNIFICANT CHANGE UP (ref 7–60)
LYMPHOCYTES # BLD AUTO: 2.01 K/UL — SIGNIFICANT CHANGE UP (ref 1.2–3.4)
LYMPHOCYTES # BLD AUTO: 21.9 % — SIGNIFICANT CHANGE UP (ref 20.5–51.1)
MCHC RBC-ENTMCNC: 32.1 PG — HIGH (ref 27–31)
MCHC RBC-ENTMCNC: 33.8 G/DL — SIGNIFICANT CHANGE UP (ref 32–37)
MCV RBC AUTO: 95.2 FL — HIGH (ref 80–94)
MONOCYTES # BLD AUTO: 0.75 K/UL — HIGH (ref 0.1–0.6)
MONOCYTES NFR BLD AUTO: 8.2 % — SIGNIFICANT CHANGE UP (ref 1.7–9.3)
NEUTROPHILS # BLD AUTO: 6.09 K/UL — SIGNIFICANT CHANGE UP (ref 1.4–6.5)
NEUTROPHILS NFR BLD AUTO: 66.6 % — SIGNIFICANT CHANGE UP (ref 42.2–75.2)
NITRITE UR-MCNC: NEGATIVE — SIGNIFICANT CHANGE UP
NRBC # BLD: 0 /100 WBCS — SIGNIFICANT CHANGE UP (ref 0–0)
PH UR: 5.5 — SIGNIFICANT CHANGE UP (ref 5–8)
PLATELET # BLD AUTO: 220 K/UL — SIGNIFICANT CHANGE UP (ref 130–400)
POTASSIUM SERPL-MCNC: 4.6 MMOL/L — SIGNIFICANT CHANGE UP (ref 3.5–5)
POTASSIUM SERPL-SCNC: 4.6 MMOL/L — SIGNIFICANT CHANGE UP (ref 3.5–5)
PROT SERPL-MCNC: 7.2 G/DL — SIGNIFICANT CHANGE UP (ref 6–8)
PROT UR-MCNC: SIGNIFICANT CHANGE UP
RBC # BLD: 4.76 M/UL — SIGNIFICANT CHANGE UP (ref 4.7–6.1)
RBC # FLD: 12.4 % — SIGNIFICANT CHANGE UP (ref 11.5–14.5)
SODIUM SERPL-SCNC: 141 MMOL/L — SIGNIFICANT CHANGE UP (ref 135–146)
SP GR SPEC: 1.03 — SIGNIFICANT CHANGE UP (ref 1.01–1.03)
UROBILINOGEN FLD QL: SIGNIFICANT CHANGE UP
WBC # BLD: 9.16 K/UL — SIGNIFICANT CHANGE UP (ref 4.8–10.8)
WBC # FLD AUTO: 9.16 K/UL — SIGNIFICANT CHANGE UP (ref 4.8–10.8)

## 2022-01-19 PROCEDURE — 99285 EMERGENCY DEPT VISIT HI MDM: CPT

## 2022-01-19 PROCEDURE — 74177 CT ABD & PELVIS W/CONTRAST: CPT | Mod: 26,MA

## 2022-01-19 PROCEDURE — 71045 X-RAY EXAM CHEST 1 VIEW: CPT | Mod: 26

## 2022-01-19 RX ORDER — IOHEXOL 300 MG/ML
30 INJECTION, SOLUTION INTRAVENOUS ONCE
Refills: 0 | Status: COMPLETED | OUTPATIENT
Start: 2022-01-19 | End: 2022-01-19

## 2022-01-19 RX ORDER — FAMOTIDINE 10 MG/ML
20 INJECTION INTRAVENOUS ONCE
Refills: 0 | Status: COMPLETED | OUTPATIENT
Start: 2022-01-19 | End: 2022-01-19

## 2022-01-19 RX ORDER — OMEPRAZOLE 10 MG/1
1 CAPSULE, DELAYED RELEASE ORAL
Qty: 14 | Refills: 0
Start: 2022-01-19 | End: 2022-02-01

## 2022-01-19 RX ORDER — SODIUM CHLORIDE 9 MG/ML
1000 INJECTION INTRAMUSCULAR; INTRAVENOUS; SUBCUTANEOUS ONCE
Refills: 0 | Status: COMPLETED | OUTPATIENT
Start: 2022-01-19 | End: 2022-01-19

## 2022-01-19 RX ADMIN — SODIUM CHLORIDE 1000 MILLILITER(S): 9 INJECTION INTRAMUSCULAR; INTRAVENOUS; SUBCUTANEOUS at 11:26

## 2022-01-19 RX ADMIN — FAMOTIDINE 20 MILLIGRAM(S): 10 INJECTION INTRAVENOUS at 11:26

## 2022-01-19 RX ADMIN — IOHEXOL 30 MILLILITER(S): 300 INJECTION, SOLUTION INTRAVENOUS at 11:26

## 2022-01-19 RX ADMIN — SODIUM CHLORIDE 1000 MILLILITER(S): 9 INJECTION INTRAMUSCULAR; INTRAVENOUS; SUBCUTANEOUS at 12:25

## 2022-01-19 NOTE — ED ADULT TRIAGE NOTE - CHIEF COMPLAINT QUOTE
pt came to ED c/o abdominal pain, states he can not get an appt with a GI doctor. denies N/V pt came to ED c/o abdominal pain, states he can not get an appt with a GI doctor. denies N/V. pt states he tested (+)COVID "1 week ago"

## 2022-01-19 NOTE — ED ADULT NURSE NOTE - CAS EDN DISCHARGE INTERVENTIONS
BLOOD GLUCOSE CHECK, 144, NO INSULIN COVERAGE TAKEN. AFTERNOON MEDICATIONS GIVEN. NO SIGNS 
OF DISTRESS NOTED. WILL CONTINUE TO MONITOR. IV discontinued, cath removed intact

## 2022-01-19 NOTE — ED PROVIDER NOTE - CARE PROVIDER_API CALL
Gamaliel Bryan)  Gastroenterology; Internal Medicine  [address] [place] [address]  Phone: [phone]  Fax: [phone]  Follow Up Time:

## 2022-01-19 NOTE — ED PROVIDER NOTE - ATTENDING CONTRIBUTION TO CARE
56-year-old male with a past medical history significant for chronic back pain carpal tunnel who presents with abdominal pain.  Patient states that he has been having abdominal pain on and off for approximately 2 months.  Patient states that he has been having mostly pain on the left side of the abdomen however denies any blood in the urine or in the fecal matter.  Patient denies any fevers chills nausea vomiting or any other medical complaints.  Of note patient is COVID-positive.    VITAL SIGNS: I have reviewed nursing notes and confirm.  CONSTITUTIONAL: non-toxic, well appearing  SKIN: no rash, no petechiae.  EYES: EOMI, pink conjunctiva, anicteric  ENT: tongue midline, no exudates, MMM  NECK: Supple; no meningismus, no JVD  CARD: RRR, no murmurs, equal radial pulses bilaterally 2+  RESP: CTAB, no respiratory distress  ABD: Soft, non-tender, non-distended, no peritoneal signs, no HSM, no CVA tenderness  EXT: Normal ROM x4. No edema.   NEURO: Alert, oriented x3. CN2-12 intact, equal strength bilaterally, nl gait.    a/p  56 yr old m that presents with abd pain   -labs  -ua  -imaging  -pain management  -reassess  -dispo pending

## 2022-01-19 NOTE — ED PROVIDER NOTE - CARDIAC, MLM
Important Medication Changes:start Metoprolol 25 mg by mouth twice a day    Further testing to be done:Have an EKG in 2 weeks    Next follow up visit: In office based on ekg     You can consider signing up for [employer] our popular online communication portal to schedule an appointment, ask for a refill, see your results, or message the staff about your concerns. The nurses will get back to you as soon as they can. Please allow 24-48 hours if it's an emergency call the office to discuss your symptoms with a triage nurse.    Go to: [email or web address]           Normal rate, regular rhythm.  Heart sounds S1, S2.

## 2022-01-19 NOTE — ED PROVIDER NOTE - PATIENT PORTAL LINK FT
You can access the FollowMyHealth Patient Portal offered by [facility] by registering at the following website: [email or web address]. By joining [employer]’s FollowMyHealth portal, you will also be able to view your health information using other applications (apps) compatible with our system.

## 2022-01-19 NOTE — ED ADULT NURSE NOTE - ISOLATION TYPE:
Routing refill request to provider for review/approval because:  Drug not on the [facility] refill protocol     Luisa PUGAN, RN     Airborne+Contact precautions

## 2022-01-19 NOTE — ED PROVIDER NOTE - CLINICAL SUMMARY MEDICAL DECISION MAKING FREE TEXT BOX
VSS.  No distress.  Labs reassuring.  CT shows no acute intra abdominal process.  Tolerating PO.  D/C home.  Strict return instructions discussed.

## 2022-01-19 NOTE — ED PROVIDER NOTE - OBJECTIVE STATEMENT
57 y/o male Macedonian speaking presents to the ED c/o "I have left sided abdominal pain and my stomach feels hard and hot for the last 2 weeks. LBM normal today. I've been eating normally. I can't get a GI appointment for 2 weeks." no n/v/d/fever/chills/urinary symptoms Unvaccinated

## 2022-01-19 NOTE — ED ADULT NURSE NOTE - CHIEF COMPLAINT QUOTE
pt came to ED c/o abdominal pain, states he can not get an appt with a GI doctor. denies N/V. pt states he tested (+)COVID "1 week ago"

## 2022-01-20 ENCOUNTER — NON-APPOINTMENT (OUTPATIENT)
Age: 57
End: 2022-01-20

## 2022-01-21 ENCOUNTER — OUTPATIENT (OUTPATIENT)
Dept: OUTPATIENT SERVICES | Facility: HOSPITAL | Age: 57
LOS: 1 days | Discharge: HOME | End: 2022-01-21

## 2022-01-21 ENCOUNTER — APPOINTMENT (OUTPATIENT)
Age: 57
End: 2022-01-21
Payer: MEDICAID

## 2022-01-21 ENCOUNTER — LABORATORY RESULT (OUTPATIENT)
Age: 57
End: 2022-01-21

## 2022-01-21 VITALS
HEIGHT: 66 IN | WEIGHT: 218 LBS | TEMPERATURE: 96.3 F | BODY MASS INDEX: 35.03 KG/M2 | OXYGEN SATURATION: 98 % | DIASTOLIC BLOOD PRESSURE: 81 MMHG | HEART RATE: 77 BPM | SYSTOLIC BLOOD PRESSURE: 137 MMHG

## 2022-01-21 PROCEDURE — ZZZZZ: CPT

## 2022-01-21 RX ORDER — CYCLOBENZAPRINE HYDROCHLORIDE 10 MG/1
10 TABLET, FILM COATED ORAL EVERY 8 HOURS
Qty: 90 | Refills: 0 | Status: DISCONTINUED | COMMUNITY
Start: 2019-11-20 | End: 2022-01-21

## 2022-01-21 RX ORDER — LORATADINE 10 MG
17 TABLET,DISINTEGRATING ORAL
Qty: 2 | Refills: 0 | Status: ACTIVE | COMMUNITY
Start: 2022-01-21 | End: 1900-01-01

## 2022-01-21 RX ORDER — TRAMADOL HYDROCHLORIDE 25 MG/1
TABLET, COATED ORAL
Refills: 0 | Status: ACTIVE | COMMUNITY

## 2022-01-21 RX ORDER — OMEPRAZOLE 40 MG/1
CAPSULE, DELAYED RELEASE ORAL
Refills: 0 | Status: ACTIVE | COMMUNITY

## 2022-01-21 RX ORDER — DOCUSATE SODIUM 100 MG/1
100 CAPSULE ORAL
Qty: 14 | Refills: 0 | Status: DISCONTINUED | COMMUNITY
Start: 2019-12-20 | End: 2022-01-21

## 2022-01-21 RX ORDER — TRAMADOL HYDROCHLORIDE 50 MG/1
50 TABLET, COATED ORAL EVERY 6 HOURS
Qty: 28 | Refills: 0 | Status: DISCONTINUED | COMMUNITY
Start: 2019-11-25 | End: 2022-01-21

## 2022-01-21 RX ORDER — CYCLOBENZAPRINE HYDROCHLORIDE 10 MG/1
10 TABLET, FILM COATED ORAL EVERY 8 HOURS
Qty: 90 | Refills: 0 | Status: DISCONTINUED | COMMUNITY
Start: 2020-01-22 | End: 2022-01-21

## 2022-01-21 NOTE — HISTORY OF PRESENT ILLNESS
[Heartburn] : denies heartburn [Nausea] : denies nausea [Vomiting] : denies vomiting [Diarrhea] : denies diarrhea [Constipation] : denies constipation [Yellow Skin Or Eyes (Jaundice)] : denies jaundice [de-identified] : a 57 yo man, obese BMI 35.19\par patient complains of abdominal pain, LUQ, \par seen in ED on 1/2- 1/13 - 1/19 \par labs showed macrocytosis but now anemia \par LFts normal except for one time ALT 48\par CT: hepatic steatosis, sigmoid diverticulosis, LUQ small bowel dilation on LUQ on 1/13 that resolved on 1/19\par \par LUQ and epigastric, pain, like bursting, burning and shooting sensation, 8/10, continuous, worse with meals. BM 1-3 / day. pain not relieved by BM. \par was sent on famotidine, levaquin and omeprazole from ED

## 2022-01-21 NOTE — REVIEW OF SYSTEMS
[As Noted in HPI] : as noted in HPI [Fever] : no fever [Chills] : no chills [Eye Pain] : no eye pain [Red Eyes] : eyes not red [Earache] : no earache [Chest Pain] : no chest pain [Palpitations] : no palpitations [Cough] : no cough [SOB on Exertion] : no shortness of breath during exertion

## 2022-01-21 NOTE — REASON FOR VISIT
[Home] : at home, [unfilled] , at the time of the visit. [Medical Office: ([facility])___] : at the medical office located in  [Verbal consent obtained from patient] : the patient, [unfilled] [Post ER] : a post ER visit [Interpreters_IDNumber] : [id number] [Interpreters_FullName] : mikey [TWNoteComboBox1] : Colombian

## 2022-01-22 LAB
HBV CORE IGG+IGM SER QL: NONREACTIVE
HBV SURFACE AB SER QL: NONREACTIVE
HBV SURFACE AG SER QL: NONREACTIVE
HCV AB SER QL: NONREACTIVE
HCV S/CO RATIO: 0.14 S/CO
HEPATITIS A IGG ANTIBODY: REACTIVE

## 2022-01-25 RX ORDER — SOD SULF/SODIUM/NAHCO3/KCL/PEG
1 SOLUTION, RECONSTITUTED, ORAL ORAL
Qty: 1 | Refills: 0
Start: 2022-01-25 | End: 2022-01-25

## 2022-01-26 ENCOUNTER — APPOINTMENT (OUTPATIENT)
Dept: INTERNAL MEDICINE | Facility: CLINIC | Age: 57
End: 2022-01-26

## 2022-01-26 ENCOUNTER — OUTPATIENT (OUTPATIENT)
Dept: OUTPATIENT SERVICES | Facility: HOSPITAL | Age: 57
LOS: 1 days | Discharge: HOME | End: 2022-01-26

## 2022-01-26 ENCOUNTER — NON-APPOINTMENT (OUTPATIENT)
Age: 57
End: 2022-01-26

## 2022-01-26 VITALS
OXYGEN SATURATION: 97 % | BODY MASS INDEX: 36.48 KG/M2 | DIASTOLIC BLOOD PRESSURE: 82 MMHG | TEMPERATURE: 98.2 F | WEIGHT: 227 LBS | HEIGHT: 66 IN | SYSTOLIC BLOOD PRESSURE: 132 MMHG | HEART RATE: 72 BPM

## 2022-01-26 DIAGNOSIS — K76.0 FATTY (CHANGE OF) LIVER, NOT ELSEWHERE CLASSIFIED: ICD-10-CM

## 2022-01-26 DIAGNOSIS — R10.12 LEFT UPPER QUADRANT PAIN: ICD-10-CM

## 2022-01-26 PROCEDURE — 99214 OFFICE O/P EST MOD 30 MIN: CPT | Mod: GC

## 2022-01-26 NOTE — PHYSICAL EXAM
[Normal Sclera/Conjunctiva] : normal sclera/conjunctiva [Normal Outer Ear/Nose] : the outer ears and nose were normal in appearance [No JVD] : no jugular venous distention [No Respiratory Distress] : no respiratory distress  [Normal Rate] : normal rate  [No Edema] : there was no peripheral edema [No Acute Distress] : no acute distress [Clear to Auscultation] : lungs were clear to auscultation bilaterally [Regular Rhythm] : with a regular rhythm [Normal S1, S2] : normal S1 and S2 [Soft] : abdomen soft [Non Tender] : non-tender [Non-distended] : non-distended [Grossly Normal Strength/Tone] : grossly normal strength/tone [No Rash] : no rash [Coordination Grossly Intact] : coordination grossly intact [Normal Affect] : the affect was normal [Normal Insight/Judgement] : insight and judgment were intact

## 2022-01-26 NOTE — HISTORY OF PRESENT ILLNESS
[FreeTextEntry1] : Pt is a 57 yo M who comes to the clinic for complaint of abdmonial pain  [Interpreters_IDNumber] : [id number] [de-identified] : Pt is a 57 Yo Mw/ pmhx of chronic back pain s/p T11 and T12 kyphoplasty in 12/2019. Last seen in clinic in 2019.  who comes to the clinic for follow-up. Pt complains of  left sided abdominal pain for about 3 weeks. describe the pain as a constant  7/10 non-radiating burning/stabbing sensation in his LLQ.  denies any n/v/d or constipation. He was seen by GI on 21/01/2022 and plan is to schedule for EGD/Colonoscopy.

## 2022-01-26 NOTE — ASSESSMENT
[FreeTextEntry1] : Pt is a 57 Yo Mw/ pmhx of chronic back pain s/p T11 and T12 kyphoplasty in 12/2019. Last seen in clinic in 2019.  who comes to the clinic for follow-up.\par \par #LUQ Abdominal pain\par - Hepatitis panel negative \par - scheduled for EGD/ Colonoscopy with GI on 31/01/2022\par - f/u w/ GI \par \par #Prediabetes\par - A1c (2019) 6.2\par - repeat A1C \par \par #HCM\par - colonoscopy with GI on Monday (01/31)\par - routine labs sent \par - not vaccinated  -- tested positive 01/13/2022\par - rtc in 1 months. and prn

## 2022-01-26 NOTE — REVIEW OF SYSTEMS
[Negative] : Respiratory [Joint Pain] : joint pain [Muscle Pain] : muscle pain [FreeTextEntry7] : see HPI

## 2022-01-28 ENCOUNTER — LABORATORY RESULT (OUTPATIENT)
Age: 57
End: 2022-01-28

## 2022-01-28 DIAGNOSIS — Z00.00 ENCOUNTER FOR GENERAL ADULT MEDICAL EXAMINATION WITHOUT ABNORMAL FINDINGS: ICD-10-CM

## 2022-01-28 DIAGNOSIS — R73.03 PREDIABETES: ICD-10-CM

## 2022-01-28 DIAGNOSIS — R10.12 LEFT UPPER QUADRANT PAIN: ICD-10-CM

## 2022-01-28 LAB
ALBUMIN SERPL ELPH-MCNC: 4.7 G/DL
ALP BLD-CCNC: 75 U/L
ALT SERPL-CCNC: 34 U/L
ANION GAP SERPL CALC-SCNC: 16 MMOL/L
AST SERPL-CCNC: 26 U/L
BASOPHILS # BLD AUTO: 0.04 K/UL
BASOPHILS NFR BLD AUTO: 0.5 %
BILIRUB SERPL-MCNC: 0.6 MG/DL
BUN SERPL-MCNC: 12 MG/DL
CALCIUM SERPL-MCNC: 9.7 MG/DL
CHLORIDE SERPL-SCNC: 104 MMOL/L
CHOLEST SERPL-MCNC: 174 MG/DL
CO2 SERPL-SCNC: 22 MMOL/L
CREAT SERPL-MCNC: 0.8 MG/DL
EOSINOPHIL # BLD AUTO: 0.15 K/UL
EOSINOPHIL NFR BLD AUTO: 2 %
ESTIMATED AVERAGE GLUCOSE: 154 MG/DL
GLUCOSE SERPL-MCNC: 132 MG/DL
HBA1C MFR BLD HPLC: 7 %
HCT VFR BLD CALC: 45.7 %
HDLC SERPL-MCNC: 32 MG/DL
HGB BLD-MCNC: 14.5 G/DL
IMM GRANULOCYTES NFR BLD AUTO: 0.3 %
LDLC SERPL CALC-MCNC: 125 MG/DL
LYMPHOCYTES # BLD AUTO: 1.98 K/UL
LYMPHOCYTES NFR BLD AUTO: 26.1 %
MAN DIFF?: NORMAL
MCHC RBC-ENTMCNC: 31.7 G/DL
MCHC RBC-ENTMCNC: 31.7 PG
MCV RBC AUTO: 100 FL
MONOCYTES # BLD AUTO: 0.65 K/UL
MONOCYTES NFR BLD AUTO: 8.6 %
NEUTROPHILS # BLD AUTO: 4.76 K/UL
NEUTROPHILS NFR BLD AUTO: 62.5 %
NONHDLC SERPL-MCNC: 142 MG/DL
PLATELET # BLD AUTO: 188 K/UL
POTASSIUM SERPL-SCNC: 4.5 MMOL/L
PROT SERPL-MCNC: 7.2 G/DL
RBC # BLD: 4.57 M/UL
RBC # FLD: 12.6 %
SODIUM SERPL-SCNC: 142 MMOL/L
TRIGL SERPL-MCNC: 138 MG/DL
TSH SERPL-ACNC: 2.2 UIU/ML
WBC # FLD AUTO: 7.6 K/UL

## 2022-01-31 ENCOUNTER — RESULT REVIEW (OUTPATIENT)
Age: 57
End: 2022-01-31

## 2022-01-31 ENCOUNTER — OUTPATIENT (OUTPATIENT)
Dept: OUTPATIENT SERVICES | Facility: HOSPITAL | Age: 57
LOS: 1 days | Discharge: HOME | End: 2022-01-31
Payer: SUBSIDIZED

## 2022-01-31 ENCOUNTER — TRANSCRIPTION ENCOUNTER (OUTPATIENT)
Age: 57
End: 2022-01-31

## 2022-01-31 VITALS
DIASTOLIC BLOOD PRESSURE: 86 MMHG | WEIGHT: 218.92 LBS | HEART RATE: 91 BPM | TEMPERATURE: 98 F | SYSTOLIC BLOOD PRESSURE: 141 MMHG | HEIGHT: 66.54 IN | RESPIRATION RATE: 18 BRPM

## 2022-01-31 VITALS
HEART RATE: 81 BPM | OXYGEN SATURATION: 97 % | RESPIRATION RATE: 18 BRPM | SYSTOLIC BLOOD PRESSURE: 139 MMHG | DIASTOLIC BLOOD PRESSURE: 76 MMHG

## 2022-01-31 PROCEDURE — 88312 SPECIAL STAINS GROUP 1: CPT | Mod: 26

## 2022-01-31 PROCEDURE — 45385 COLONOSCOPY W/LESION REMOVAL: CPT

## 2022-01-31 PROCEDURE — 45381 COLONOSCOPY SUBMUCOUS NJX: CPT | Mod: XU

## 2022-01-31 PROCEDURE — 43239 EGD BIOPSY SINGLE/MULTIPLE: CPT | Mod: XS

## 2022-01-31 PROCEDURE — 88305 TISSUE EXAM BY PATHOLOGIST: CPT | Mod: 26

## 2022-01-31 RX ORDER — PANTOPRAZOLE SODIUM 20 MG/1
1 TABLET, DELAYED RELEASE ORAL
Qty: 30 | Refills: 1
Start: 2022-01-31 | End: 2022-03-31

## 2022-01-31 NOTE — ASU DISCHARGE PLAN (ADULT/PEDIATRIC) - NS MD DC FALL RISK RISK
For information on Fall & Injury Prevention, visit: [email or web address] OR  [email or web address] OR  https://www.cdc.gov/steadi/patient.html

## 2022-01-31 NOTE — ASU DISCHARGE PLAN (ADULT/PEDIATRIC) - WILL THE PATIENT ACCEPT THE PFIZER COVID-19 VACCINE IF ELIGIBLE AND IT IS AVAILABLE?
We will see you back as planned. If you have any questions or concerns, we can be reached Monday through Friday 8am - 430pm at [phone] ([facility]). If you have concerns related to a potential reaction/side effect after hours/weekends/holiday's, please seek emergent medical care.      Not applicable

## 2022-01-31 NOTE — CHART NOTE - NSCHARTNOTEFT_GEN_A_CORE
PACU ANESTHESIA ADMISSION NOTE      Procedure:   Post op diagnosis:      ____  Intubated  TV:______       Rate: ______      FiO2: ______    __x__  Patent Airway    __x__  Full return of protective reflexes    __x__  Full recovery from anesthesia / back to baseline       Mental Status:  ____ Awake   _____ Alert   __x___ Drowsy   _____ Sedated    Nausea/Vomiting:  _x___ NO  ______Yes,   __x__ See Post - Op Orders          Pain Scale (0-10):  __0___    Treatment: ____ None    __x__ See Post - Op/PCA Orders    Post - Operative Fluids:   ____ Oral   __x__ See Post - Op Orders    Plan: Discharge:   _x___Home       _____Floor     _____Critical Care    _____  Other:_________________    Comments: When parameters met.

## 2022-02-03 LAB — SURGICAL PATHOLOGY STUDY: SIGNIFICANT CHANGE UP

## 2022-02-04 LAB — SURGICAL PATHOLOGY STUDY: SIGNIFICANT CHANGE UP

## 2022-02-08 DIAGNOSIS — Z12.11 ENCOUNTER FOR SCREENING FOR MALIGNANT NEOPLASM OF COLON: ICD-10-CM

## 2022-02-08 DIAGNOSIS — K29.80 DUODENITIS WITHOUT BLEEDING: ICD-10-CM

## 2022-02-08 DIAGNOSIS — K57.30 DIVERTICULOSIS OF LARGE INTESTINE WITHOUT PERFORATION OR ABSCESS WITHOUT BLEEDING: ICD-10-CM

## 2022-02-08 DIAGNOSIS — D12.8 BENIGN NEOPLASM OF RECTUM: ICD-10-CM

## 2022-02-08 DIAGNOSIS — B96.81 HELICOBACTER PYLORI [H. PYLORI] AS THE CAUSE OF DISEASES CLASSIFIED ELSEWHERE: ICD-10-CM

## 2022-02-08 DIAGNOSIS — F17.210 NICOTINE DEPENDENCE, CIGARETTES, UNCOMPLICATED: ICD-10-CM

## 2022-02-08 DIAGNOSIS — D12.7 BENIGN NEOPLASM OF RECTOSIGMOID JUNCTION: ICD-10-CM

## 2022-02-08 DIAGNOSIS — K29.50 UNSPECIFIED CHRONIC GASTRITIS WITHOUT BLEEDING: ICD-10-CM

## 2022-02-19 NOTE — ED PROVIDER NOTE - PATIENT PORTAL LINK FT
History  Chief Complaint   Patient presents with    Weakness - Generalized     patient presents to ED with weaknes of the legs patient describes them as "rubbery" and slight frontal headache that has been intermittent since yesterday controlld with tylenol     61year old male presents for evaluation of severe frontal headache which has been waxing and waning since yesterday associated with generalized weakness beginning at 6 pm this evening where he feels his legs buckle under him when he walks  Patient denies syncope or head strike  No recent illness  No chest pain or shortness of breath  Patient has history of prior right MCA stroke status post thrombectomy in 2019 with left sided deficits which have since improved  Patient states he has decreased sensation in the left leg chronically from nerve injury following spinal injections  History provided by:  Patient  Headache  Pain location:  Frontal  Radiates to:  Does not radiate  Onset quality:  Gradual  Duration:  2 days  Timing:  Constant  Progression:  Waxing and waning  Chronicity:  New  Similar to prior headaches: no    Context: bright light    Relieved by: slight improvement with tylenol  Worsened by:  Light  Ineffective treatments:  None tried  Associated symptoms: weakness (bilateral legs)    Associated symptoms: no abdominal pain, no back pain, no congestion, no cough, no diarrhea, no fever, no nausea, no neck pain and no vomiting        Prior to Admission Medications   Prescriptions Last Dose Informant Patient Reported? Taking?    Melatonin 10 MG TABS  Self Yes No   Sig: Take 20 mg by mouth as needed Takes 2 tabs as needed    TOPROL  MG 24 hr tablet  Self Yes No   Sig: Take 100 mg by mouth daily    atorvastatin (LIPITOR) 40 mg tablet   Yes No   Sig: Take 40 mg by mouth daily    citalopram (CeleXA) 10 mg/5 mL suspension   Yes No   Sig: Take 10 mg by mouth daily    Patient not taking: Reported on 1/6/2022    clopidogrel (PLAVIX) 75 mg tablet Self No No   Sig: Take 1 tablet (75 mg total) by mouth daily   Patient taking differently: Take 80 mg by mouth daily     donepezil (ARICEPT) 10 mg tablet   No No   Sig: Take 0 5 tablets (5 mg total) by mouth 2 (two) times a day   donepezil (ARICEPT) 5 mg tablet   No No   Sig: TAKE 1 TABLET TWICE A DAY   fluticasone (FLONASE) 50 mcg/act nasal spray  Self No No   Si spray into each nostril daily   ibuprofen (MOTRIN) 600 mg tablet  Self No No   Sig: Take 1 tablet (600 mg total) by mouth every 6 (six) hours as needed for mild pain for up to 7 days   metFORMIN (GLUCOPHAGE) 500 mg tablet  Self No No   Sig: Take 1 tablet (500 mg total) by mouth daily with breakfast   omeprazole (PriLOSEC OTC) 20 MG tablet  Self Yes No   Sig: Take 20 mg by mouth daily   sildenafil (REVATIO) 20 mg tablet  Self Yes No   Sig: TAKE 2 3 TABLETS BY MOUTH AS NEEDED FOR SEXUAL ACTIVITY      Facility-Administered Medications: None       Past Medical History:   Diagnosis Date    Diabetes mellitus ([facility] [address] )     Dyslipidemia 3/26/2019    Hypertension     Stroke [facility])        Past Surgical History:   Procedure Laterality Date    BACK SURGERY      IR STROKE ALERT  3/19/2019    SHOULDER SURGERY         Family History   Problem Relation Age of Onset    Heart attack Mother     Diabetes Mother     Prostate cancer Father      I have reviewed and agree with the history as documented  E-Cigarette/Vaping    E-Cigarette Use Never User      E-Cigarette/Vaping Substances    Nicotine No     THC No     CBD No     Flavoring No     Other No     Unknown No      Social History     Tobacco Use    Smoking status: Former Smoker    Smokeless tobacco: Never Used   Vaping Use    Vaping Use: Never used   Substance Use Topics    Alcohol use: Not Currently    Drug use: Never       Review of Systems   Constitutional: Positive for appetite change (decreased today)  Negative for chills and fever  HENT: Negative for congestion      Respiratory: Negative for cough and shortness of breath  Cardiovascular: Negative for chest pain  Gastrointestinal: Negative for abdominal pain, diarrhea, nausea and vomiting  Musculoskeletal: Negative for back pain and neck pain  Skin: Negative for rash and wound  Neurological: Positive for weakness (bilateral legs) and headaches  All other systems reviewed and are negative  Physical Exam  Physical Exam  Vitals and nursing note reviewed  Constitutional:       General: He is not in acute distress  Appearance: He is well-developed  He is not toxic-appearing or diaphoretic  HENT:      Head: Normocephalic and atraumatic  Right Ear: External ear normal       Left Ear: External ear normal       Nose: Nose normal    Eyes:      General: No scleral icterus  Cardiovascular:      Rate and Rhythm: Normal rate and regular rhythm  Heart sounds: Normal heart sounds  Pulmonary:      Effort: Pulmonary effort is normal  No respiratory distress  Breath sounds: Normal breath sounds  Abdominal:      General: There is no distension  Palpations: Abdomen is soft  Tenderness: There is no abdominal tenderness  Musculoskeletal:         General: No deformity  Normal range of motion  Skin:     Findings: No rash  Neurological:      General: No focal deficit present  Mental Status: He is alert and oriented to person, place, and time  Cranial Nerves: Cranial nerves are intact  Sensory: Sensation is intact  Motor: Motor function is intact  Coordination: Coordination is intact        Gait: Gait normal    Psychiatric:         Mood and Affect: Mood normal          Vital Signs  ED Triage Vitals   Temperature Pulse Respirations Blood Pressure SpO2   02/18/22 2059 02/18/22 2059 02/18/22 2059 02/18/22 2101 02/18/22 2059   98 °F (36 7 °C) 73 16 (!) 199/89 98 %      Temp src Heart Rate Source Patient Position - Orthostatic VS BP Location FiO2 (%)   -- 02/18/22 2059 02/18/22 2059 02/18/22 2059 -- Monitor Sitting Right arm       Pain Score       02/18/22 2059       4           Vitals:    02/18/22 2230 02/18/22 2300 02/18/22 2330 02/19/22 0030   BP: 152/67 161/71 141/63 127/63   Pulse: 60 60 58 56   Patient Position - Orthostatic VS: Sitting            Visual Acuity  Visual Acuity      Most Recent Value   L Pupil Size (mm) 3   R Pupil Size (mm) 3          ED Medications  Medications   metoclopramide (REGLAN) injection 10 mg (10 mg Intravenous Given 2/18/22 2217)   magnesium sulfate 2 g/50 mL IVPB (premix) 2 g (0 g Intravenous Stopped 2/18/22 2335)   sodium chloride 0 9 % bolus 1,000 mL (0 mL Intravenous Stopped 2/19/22 0115)   iohexol (OMNIPAQUE) 350 MG/ML injection (SINGLE-DOSE) 100 mL (100 mL Intravenous Given 2/18/22 2212)       Diagnostic Studies  Results Reviewed     Procedure Component Value Units Date/Time    HS Troponin I 2hr [[id number]]  (Normal) Collected: 02/18/22 2335    Lab Status: Final result Specimen: Blood from Arm, Right Updated: 02/19/22 0008     hs TnI 2hr 12 ng/L      Delta 2hr hsTnI -1 ng/L     HS Troponin 0hr (reflex protocol) [[id number]]  (Normal) Collected: 02/18/22 2108    Lab Status: Final result Specimen: Blood from Arm, Right Updated: 02/18/22 2145     hs TnI 0hr 13 ng/L     Comprehensive metabolic panel [[id number]]  (Abnormal) Collected: 02/18/22 2108    Lab Status: Final result Specimen: Blood from Arm, Right Updated: 02/18/22 2136     Sodium 137 mmol/L      Potassium 3 7 mmol/L      Chloride 102 mmol/L      CO2 27 mmol/L      ANION GAP 8 mmol/L      BUN 16 mg/dL      Creatinine 1 09 mg/dL      Glucose 172 mg/dL      Calcium 8 9 mg/dL      AST 21 U/L      ALT 36 U/L      Alkaline Phosphatase 108 U/L      Total Protein 7 2 g/dL      Albumin 3 7 g/dL      Total Bilirubin 0 50 mg/dL      eGFR 71 ml/min/1 73sq m     Narrative:      [place] guidelines for Chronic Kidney Disease (CKD):     Stage 1 with normal or high GFR (GFR > 90 mL/min/1 73 square meters)    Stage 2 Mild CKD (GFR = 60-89 mL/min/1 73 square meters)    Stage 3A Moderate CKD (GFR = 45-59 mL/min/1 73 square meters)    Stage 3B Moderate CKD (GFR = 30-44 mL/min/1 73 square meters)    Stage 4 Severe CKD (GFR = 15-29 mL/min/1 73 square meters)    Stage 5 End Stage CKD (GFR <15 mL/min/1 73 square meters)  Note: GFR calculation is accurate only with a steady state creatinine    APTT [[id number]]  (Normal) Collected: 02/18/22 2108    Lab Status: Final result Specimen: Blood from Arm, Right Updated: 02/18/22 2136     PTT 31 seconds     Protime-INR [[id number]]  (Normal) Collected: 02/18/22 2108    Lab Status: Final result Specimen: Blood from Arm, Right Updated: 02/18/22 2136     Protime 13 8 seconds      INR 1 07    CBC and differential [[id number]]  (Abnormal) Collected: 02/18/22 2108    Lab Status: Final result Specimen: Blood from Arm, Right Updated: 02/18/22 2119     WBC 11 05 Thousand/uL      RBC 5 08 Million/uL      Hemoglobin 15 8 g/dL      Hematocrit 49 0 %      MCV 97 fL      MCH 31 1 pg      MCHC 32 2 g/dL      RDW 13 1 %      MPV 10 0 fL      Platelets 934 Thousands/uL      nRBC 0 /100 WBCs      Neutrophils Relative 58 %      Immat GRANS % 0 %      Lymphocytes Relative 32 %      Monocytes Relative 6 %      Eosinophils Relative 3 %      Basophils Relative 1 %      Neutrophils Absolute 6 40 Thousands/µL      Immature Grans Absolute 0 04 Thousand/uL      Lymphocytes Absolute 3 51 Thousands/µL      Monocytes Absolute 0 70 Thousand/µL      Eosinophils Absolute 0 32 Thousand/µL      Basophils Absolute 0 08 Thousands/µL                  CTA head and neck with and without contrast   Final Result by Purvi Banegas MD (02/18 7175)         1  Severe stenosis in the distal right M1 segment, likely reflecting partial recanalization from prior thrombotic occlusion, as demonstrated on the 3/18/2019 exam   Distal MCA branches are patent     2   Atherosclerotic plaque in the proximal internal carotid arteries without hemodynamically significant stenosis  Findings communicated with Hira Paulino on 2/18/2022 at 11:19 PM                         Workstation performed: [id number]         XR chest 1 view portable   ED Interpretation by Zoraida Hirsch MD (02/18 2130)   No acute pulmonary pathology      Final Result by Juliet Hampton MD (02/19 0725)      No acute cardiopulmonary disease                    Workstation performed: [id number]                    Procedures  ECG 12 Lead Documentation Only    Date/Time: 2/18/2022 9:01 PM  Performed by: Zoraida Hirsch MD  Authorized by: Zoraida Hirsch MD     Indications / Diagnosis:  Generalized weakness  ECG reviewed by me, the ED Provider: yes    Patient location:  ED  Previous ECG:     Previous ECG:  Compared to current    Comparison ECG info:  3/21/19 normal ekg    Similarity:  Changes noted (twave now inverted in III)  Interpretation:     Interpretation: non-specific    Rate:     ECG rate:  68    ECG rate assessment: normal    Rhythm:     Rhythm: sinus rhythm    Ectopy:     Ectopy: none    QRS:     QRS axis:  Normal    QRS intervals:  Normal  Conduction:     Conduction: normal    ST segments:     ST segments:  Normal  T waves:     T waves: inverted      Inverted:  III             ED Course             HEART Risk Score      Most Recent Value   Heart Score Risk Calculator    History 0 Filed at: 02/18/2022 2219   ECG 1 Filed at: 02/18/2022 2219   Age 1 Filed at: 02/18/2022 2219   Risk Factors 2 Filed at: 02/18/2022 2219   Troponin 0 Filed at: 02/18/2022 2219   HEART Score 4 Filed at: 02/18/2022 2219           Stroke Assessment     Row Name 02/18/22 2131             NIH Stroke Scale    Interval Baseline      Level of Consciousness (1a ) 0      LOC Questions (1b ) 0      LOC Commands (1c ) 0      Best Gaze (2 ) 0      Visual (3 ) 0      Facial Palsy (4 ) 0      Motor Arm, Left (5a ) 0      Motor Arm, Right (5b ) 0      Motor Leg, Left (6a ) 0      Motor Leg, Right (6b ) 0      Limb Ataxia (7 ) 0      Sensory (8 ) 0      Best Language (9 ) 0      Dysarthria (10 ) 0      Extinction and Inattention (11 ) (Formerly Neglect) 0      Total 0                Most Recent Value   TPA Decision Options    TPA Decision Patient not a TPA candidate  Patient is not a candidate options Unclear time of onset outside appropriate time window  SBIRT 20yo+      Most Recent Value   SBIRT (22 yo +)    In order to provide better care to our patients, we are screening all of our patients for alcohol and drug use  Would it be okay to ask you these screening questions? Yes Filed at: 02/18/2022 2326   Initial Alcohol Screen: US AUDIT-C     1  How often do you have a drink containing alcohol? 0 Filed at: 02/18/2022 2326   2  How many drinks containing alcohol do you have on a typical day you are drinking? 0 Filed at: 02/18/2022 2326   3a  Male UNDER 65: How often do you have five or more drinks on one occasion? 0 Filed at: 02/18/2022 2326   3b  FEMALE Any Age, or MALE 65+: How often do you have 4 or more drinks on one occassion? 0 Filed at: 02/18/2022 2326   Audit-C Score 0 Filed at: 02/18/2022 2326   WILLIAM: How many times in the past year have you    Used an illegal drug or used a prescription medication for non-medical reasons? Never Filed at: 02/18/2022 2326                    MDM  Number of Diagnoses or Management Options  Acute headache: new and requires workup  Generalized weakness: new and requires workup  Diagnosis management comments: 61year old male presents for evaluation of headache associated with bilateral lower extremity weakness  No focal deficits on exam  Migraine cocktail ordered  Labs with nonspecific leukocytosis  CTA head/neck with findings consistent with prior recannulization of right MCA occlusion          Amount and/or Complexity of Data Reviewed  Clinical lab tests: ordered and reviewed  Tests in the radiology section of CPT®: ordered and reviewed  Independent visualization of images, tracings, or specimens: yes    Patient Progress  Patient progress: stable      Disposition  Final diagnoses:   Acute headache   Generalized weakness     Time reflects when diagnosis was documented in both MDM as applicable and the Disposition within this note     Time User Action Codes Description Comment    2/18/2022  9:31 PM Navin PRATT Add [R51 9] Acute headache     2/18/2022  9:31 PM Kim Sparrow Add [R53 1] Generalized weakness       ED Disposition     ED Disposition Condition Date/Time Comment    Discharge Stable Sat Feb 19, 2022  1:03 AM Jennifer Hamilton discharge to home/self care              Follow-up Information     Follow up With Specialties Details Why Contact Info Additional [address], [place] Nurse Practitioner   [address] Emergency Department Emergency Medicine  If symptoms worsen [address] [address]  [address] [facility] Emergency Department, [address], [place], [place] [address]          Discharge Medication List as of 2/19/2022  1:03 AM      CONTINUE these medications which have NOT CHANGED    Details   atorvastatin (LIPITOR) 40 mg tablet Take 40 mg by mouth daily , Historical Med      citalopram (CeleXA) 10 mg/5 mL suspension Take 10 mg by mouth daily , Historical Med      clopidogrel (PLAVIX) 75 mg tablet Take 1 tablet (75 mg total) by mouth daily, Starting Sat 4/6/2019, Print      !! donepezil (ARICEPT) 10 mg tablet Take 0 5 tablets (5 mg total) by mouth 2 (two) times a day, Starting Tue 3/2/2021, Normal      !! donepezil (ARICEPT) 5 mg tablet TAKE 1 TABLET TWICE A DAY, Normal      fluticasone (FLONASE) 50 mcg/act nasal spray 1 spray into each nostril daily, Starting Sat 4/6/2019, Print      ibuprofen (MOTRIN) 600 mg tablet Take 1 tablet (600 mg total) by mouth every 6 (six) hours as needed for mild pain for up to 7 days, Starting Tue 4/16/2019, Until Tue 3/2/2021, Print      Melatonin 10 MG TABS Take 20 mg by mouth as needed Takes 2 tabs as needed , Historical Med      metFORMIN (GLUCOPHAGE) 500 mg tablet Take 1 tablet (500 mg total) by mouth daily with breakfast, Starting Sat 4/6/2019, Print      omeprazole (PriLOSEC OTC) 20 MG tablet Take 20 mg by mouth daily, Historical Med      sildenafil (REVATIO) 20 mg tablet TAKE 2 3 TABLETS BY MOUTH AS NEEDED FOR SEXUAL ACTIVITY, Historical Med      TOPROL  MG 24 hr tablet Take 100 mg by mouth daily , Starting Wed 7/31/2019, Historical Med       !! - Potential duplicate medications found  Please discuss with provider  No discharge procedures on file      PDMP Review     None          ED Provider  Electronically Signed by           Yris Boone MD  02/19/22 6053 You can access the FollowMyHealth Patient Portal offered by [facility] by registering at the following website: [email or web address] By joining [facility]’s FollowMyHealth portal, you will also be able to view your health information using other applications (apps) compatible with our system.

## 2022-03-23 DIAGNOSIS — S22.000A WEDGE COMPRESSION FRACTURE OF UNSPECIFIED THORACIC VERTEBRA, INITIAL ENCOUNTER FOR CLOSED FRACTURE: ICD-10-CM

## 2022-03-23 DIAGNOSIS — Z00.00 ENCOUNTER FOR GENERAL ADULT MEDICAL EXAMINATION W/OUT ABNORMAL FINDINGS: ICD-10-CM

## 2022-03-23 PROCEDURE — ZZZZZ: CPT

## 2022-03-30 RX ORDER — OMEPRAZOLE 20 MG/1
20 CAPSULE, DELAYED RELEASE ORAL TWICE DAILY
Qty: 28 | Refills: 0 | Status: ACTIVE | COMMUNITY
Start: 2022-03-30 | End: 1900-01-01

## 2022-05-06 NOTE — ED PROVIDER NOTE - OBJECTIVE STATEMENT
Patient presents today for her first shingles vaccine. Patient did speak with insurance for coverage. Given in the L detoid. Patient tolerated well. VIS provided. Advised to return 2-6m for 2nd dose.   54 y m no pmh pw fall. Fall on Friday while intoxicated. States he fell off of an 8 foot porch and a friend told him he landed on his stomach. Seen at [facility] but left before evaluation. Since then has been having lower back pain and epigastric pain. Denies daily etoh use. Denies blood thinner use. Denies ha, n/v, cp, sob, vision change, neck pain, extremity pain.

## 2022-05-23 DIAGNOSIS — R73.03 PREDIABETES.: ICD-10-CM

## 2022-05-23 PROCEDURE — ZZZZZ: CPT

## 2022-07-15 ENCOUNTER — APPOINTMENT (OUTPATIENT)
Dept: GASTROENTEROLOGY | Facility: CLINIC | Age: 57
End: 2022-07-15
Payer: MEDICAID

## 2022-07-15 ENCOUNTER — NON-APPOINTMENT (OUTPATIENT)
Age: 57
End: 2022-07-15

## 2022-07-15 ENCOUNTER — OUTPATIENT (OUTPATIENT)
Dept: OUTPATIENT SERVICES | Facility: HOSPITAL | Age: 57
LOS: 1 days | Discharge: HOME | End: 2022-07-15

## 2022-07-15 VITALS
OXYGEN SATURATION: 95 % | WEIGHT: 213 LBS | BODY MASS INDEX: 34.23 KG/M2 | DIASTOLIC BLOOD PRESSURE: 80 MMHG | HEART RATE: 83 BPM | HEIGHT: 66 IN | SYSTOLIC BLOOD PRESSURE: 117 MMHG | TEMPERATURE: 98 F

## 2022-07-15 DIAGNOSIS — K29.70 GASTRITIS, UNSPECIFIED, W/OUT BLEEDING: ICD-10-CM

## 2022-07-15 DIAGNOSIS — R10.12 LEFT UPPER QUADRANT PAIN: ICD-10-CM

## 2022-07-15 DIAGNOSIS — K76.0 FATTY (CHANGE OF) LIVER, NOT ELSEWHERE CLASSIFIED: ICD-10-CM

## 2022-07-15 DIAGNOSIS — B96.81 GASTRITIS, UNSPECIFIED, W/OUT BLEEDING: ICD-10-CM

## 2022-07-15 PROCEDURE — 99212 OFFICE O/P EST SF 10 MIN: CPT | Mod: GC

## 2022-07-15 RX ORDER — PANTOPRAZOLE 40 MG/1
40 TABLET, DELAYED RELEASE ORAL DAILY
Qty: 1 | Refills: 1 | Status: ACTIVE | COMMUNITY
Start: 2022-01-21 | End: 1900-01-01

## 2022-07-15 RX ORDER — METRONIDAZOLE 250 MG/1
250 TABLET ORAL EVERY 6 HOURS
Qty: 56 | Refills: 0 | Status: ACTIVE | COMMUNITY
Start: 2022-03-30 | End: 1900-01-01

## 2022-07-15 RX ORDER — TETRACYCLINE HYDROCHLORIDE 500 MG/1
500 CAPSULE ORAL
Qty: 56 | Refills: 0 | Status: ACTIVE | COMMUNITY
Start: 2022-03-30 | End: 1900-01-01

## 2022-07-15 RX ORDER — BISMUTH SUBSALICYLATE 262 MG/1
262 TABLET, CHEWABLE ORAL 4 TIMES DAILY
Qty: 112 | Refills: 0 | Status: ACTIVE | COMMUNITY
Start: 2022-03-30 | End: 1900-01-01

## 2022-07-15 NOTE — PHYSICAL EXAM
[General Appearance - Alert] : alert [General Appearance - In No Acute Distress] : in no acute distress [] : no respiratory distress [Auscultation Breath Sounds / Voice Sounds] : lungs were clear to auscultation bilaterally [Heart Rate And Rhythm] : heart rate was normal and rhythm regular [Heart Sounds] : normal S1 and S2 [Heart Sounds Gallop] : no gallops [Murmurs] : no murmurs [Heart Sounds Pericardial Friction Rub] : no pericardial rub [Bowel Sounds] : normal bowel sounds [FreeTextEntry1] : epigastric tenderness; palpable body mass in epigastric region - tender

## 2022-07-15 NOTE — HISTORY OF PRESENT ILLNESS
[Heartburn] : denies heartburn [Nausea] : denies nausea [Vomiting] : denies vomiting [Diarrhea] : denies diarrhea [Constipation] : denies constipation [Abdominal Pain] : stable abdominal pain [Rectal Pain] : denies rectal pain [de-identified] : 57 yo M presenting for follow up of LUQ pain and hepatic steatosis. Prior to previous visit, pt had gone to ED where he was found to have hepatic steatosis, sigmoid diverticulosis and LUQ small bowel dilation. He reported LUQ and epigastric pain worse with meals. Sent home from ED with famotidine, levaquin, and omeprazole. At last visit, was started on miralax, PPI, and scheduled for EGD and coloscopy. Counseled on weight loss, lifestyle changes and alc cessation. \par \par Today patient reports intermittent LUQ/epigastric pain. Pt reports pain is usually after eating. Denies any N/V, no reflux, constipation or diarrhea. Pt reports having 3 bowel movements every morning; feels like his bowels don't completely empty out first or second time. \par \par Pt reports taking PPI for only one week. He takes Tylenol occasionally for the pain. He used laxatives for 1 week; it helped but stopped taking it due to stressors at work. He has not been taking metronidazole or tetracycline due to work hours/stressors. He reports drinking 2 6 packs of beer per week.

## 2022-07-15 NOTE — ASSESSMENT
[FreeTextEntry1] : 57 yo M with H pylori gastritis and hepatic steatosis presents for f/u after colonoscopy and EGD.\par \par #H pylori gastritis\par - pt prescribed PPI, metronidazole and tetracycline\par - pt reports taking meds for ?1 week; unclear if all medications were taken and duration of each\par - counseled patient regarding results of EGD and medical therapy for 2 weeks\par - take pantoprazole BID, metronidazole, tetracycline, and pepto bismol for 2 weeks\par - RTC in 1 month\par \par #LUQ/epigastric pain\par - continued pain\par - tender bony prominence in epigastric area on physical exam\par - xray of rib\par \par #hepatic steatosis\par - pt reports reducing alc intake; now have 2 6 packs per week\par - continue counseling on weight loss and lifestyle changes\par \par #colonoscopy\par - repeat colonoscopy in 6 months

## 2022-07-15 NOTE — REASON FOR VISIT
[Follow-Up: _____] : a [unfilled] follow-up visit [Interpreters_IDNumber] : [id number] [Interpreters_FullName] : Francisco

## 2022-07-15 NOTE — END OF VISIT
[] : Resident [FreeTextEntry3] : Pt presents for follow up after EGD/colonoscopy showing H pylori gastritis and colon polyps though poor prep.\par Recommend quadruple therapy for H pylori treatment as pt does not recall taking the medications previously and rib xray for reported tenderness. Will plan to discuss testing for HP eradication and repeat colonoscopy scheduling at follow up visit. Discussed continued lifestyle and dietary modifications and will also consider fibroscan for hepatic steatosis.

## 2022-07-15 NOTE — REVIEW OF SYSTEMS
[Abdominal Pain] : abdominal pain [Negative] : Musculoskeletal [Vomiting] : no vomiting [Constipation] : no constipation [Diarrhea] : no diarrhea [Heartburn] : no heartburn

## 2022-10-21 ENCOUNTER — APPOINTMENT (OUTPATIENT)
Dept: GASTROENTEROLOGY | Facility: CLINIC | Age: 57
End: 2022-10-21

## 2022-12-28 ENCOUNTER — OUTPATIENT (OUTPATIENT)
Dept: OUTPATIENT SERVICES | Facility: HOSPITAL | Age: 57
LOS: 1 days | Discharge: HOME | End: 2022-12-28

## 2023-01-24 ENCOUNTER — OUTPATIENT (OUTPATIENT)
Dept: OUTPATIENT SERVICES | Facility: HOSPITAL | Age: 58
LOS: 1 days | Discharge: HOME | End: 2023-01-24

## 2023-01-25 ENCOUNTER — EMERGENCY (EMERGENCY)
Facility: HOSPITAL | Age: 58
LOS: 0 days | Discharge: HOME | End: 2023-01-25
Attending: EMERGENCY MEDICINE | Admitting: EMERGENCY MEDICINE
Payer: SUBSIDIZED

## 2023-01-25 VITALS
TEMPERATURE: 98 F | RESPIRATION RATE: 16 BRPM | DIASTOLIC BLOOD PRESSURE: 71 MMHG | OXYGEN SATURATION: 97 % | SYSTOLIC BLOOD PRESSURE: 154 MMHG | HEART RATE: 79 BPM

## 2023-01-25 DIAGNOSIS — K05.20 AGGRESSIVE PERIODONTITIS, UNSPECIFIED: ICD-10-CM

## 2023-01-25 DIAGNOSIS — K08.89 OTHER SPECIFIED DISORDERS OF TEETH AND SUPPORTING STRUCTURES: ICD-10-CM

## 2023-01-25 DIAGNOSIS — Z87.39 PERSONAL HISTORY OF OTHER DISEASES OF THE MUSCULOSKELETAL SYSTEM AND CONNECTIVE TISSUE: ICD-10-CM

## 2023-01-25 DIAGNOSIS — F17.200 NICOTINE DEPENDENCE, UNSPECIFIED, UNCOMPLICATED: ICD-10-CM

## 2023-01-25 DIAGNOSIS — G50.1 ATYPICAL FACIAL PAIN: ICD-10-CM

## 2023-01-25 DIAGNOSIS — K02.9 DENTAL CARIES, UNSPECIFIED: ICD-10-CM

## 2023-01-25 PROCEDURE — 99284 EMERGENCY DEPT VISIT MOD MDM: CPT

## 2023-01-25 NOTE — CONSULT NOTE ADULT - SUBJECTIVE AND OBJECTIVE BOX
Patient is a 57y old  Male who presents with a chief complaint of  dental pain #8,    HPI:   Patient was seen yesterday at the dental clinic and options were discussed.    PAST MEDICAL & SURGICAL HISTORY:  Carpal tunnel syndrome      Back pain      No significant past surgical history        ( -  ) heart valve replacement  ( - ) joint replacement  ( -  ) pregnancy    MEDICATIONS  (STANDING):    MEDICATIONS  (PRN):      Allergies    No Known Allergies    Intolerances        FAMILY HISTORY:  FH: diabetes mellitus  mom        *Last Dental Visit: unknown    Vital Signs Last 24 Hrs  T(C): 36.4 (25 Jan 2023 08:35), Max: 36.4 (25 Jan 2023 08:35)  T(F): 97.6 (25 Jan 2023 08:35), Max: 97.6 (25 Jan 2023 08:35)  HR: 79 (25 Jan 2023 08:35) (79 - 79)  BP: 154/71 (25 Jan 2023 08:35) (154/71 - 154/71)  BP(mean): --  RR: 16 (25 Jan 2023 08:35) (16 - 16)  SpO2: 97% (25 Jan 2023 08:35) (97% - 97%)    Parameters below as of 25 Jan 2023 08:35  Patient On (Oxygen Delivery Method): room air        LABS:                  EOE:  TMJ (  - ) clicks                     ( -  ) pops                     (  - ) crepitus             Mandible <<FROM>>             Facial bones and MOM <<grossly intact>>             ( -  ) trismus             ( -  ) lymphadenopathy             ( -  ) swelling             ( -  ) asymmetry             ( -  ) palpation             ( -  ) dyspnea             ( -  ) dysphagia             ( -  ) loss of consciousness    IOE:  <<permanent >> dentition: <<grossly intact>>             hard/soft palate:  (  - ) palatal torus, <<No pathology noted>>           tongue/FOM <<No pathology noted>>           labial/buccal mucosa <<No pathology noted>>           ( +  ) percussion           ( + ) palpation           ( +  ) swelling            ( +  ) abscess           (  + ) sinus tract  #8 tender on percussion and palpation. sinus tract noted # apical to #8.  Dentition present: << y  >>  Mobility: <<  >>  Caries: << y  >>       *DENTAL RADIOGRAPHS: perviously taken 1 PA, #8 severe perio defect, non restorable.    RADIOLOGY & ADDITIONAL STUDIES:    *ASSESSMENT: Patient is a 57y old  Male who presents with a chief complaint of  dental pain #8, Patient was seen yesterday at the dental clinic and options were discussed.#8 tender on percussion and palpation. sinus tract noted # apical to #8. perviously taken 1 PA, #8 severe perio defect, non restorable.      *PLAN:  ID. [id number] Patient contacted [facility] and appointment is in 2 months. Recommend extraction today due do on going infection and pain, patient agrees.    PROCEDURE:    Treatment consequences explained as per OS sheet dated 07/13/00. Consent obtained, side site marked. Administered 1x Carpule Septocaine 4% 1:100,000 epinephrine via buccal and palatal infiltration. Elevated and delivered the tooth #8 via forceps Curetted and irrigated the socket. Sinus tract and granulation tissue apical to #8 Curetted and irrigated. Post-operative x-ray taken- negative. Hemostasis obtained. Post-operative instructions given verbally and written.       RECOMMENDATIONS:  1) Complete medication as prescribed  2) Dental F/U with outpatient dentist for comprehensive dental care.   3) If any difficulty swallowing/breathing, fever occur, return to ER.     Sy Rivera DDS, [facility][id number]

## 2023-01-25 NOTE — ED PROVIDER NOTE - OBJECTIVE STATEMENT
57-year-old female with no past medical history presents to the ED complaining of right front tooth pain and gum abscess for a couple of days.  Patient denies any fever chills or difficulty swallowing.

## 2023-01-25 NOTE — ED PROVIDER NOTE - CLINICAL SUMMARY MEDICAL DECISION MAKING FREE TEXT BOX
Patient presented with tooth pain. Otherwise afebrile, HD stable, tolerating PO at home, airway patent, clearing secretions without difficulty, speaking full sentences, no tongue elevation or cervical LAD, uvula midline. No signs of abscess on exam. Will AOU to dental clinic for further management.

## 2023-01-25 NOTE — ED PROVIDER NOTE - NS ED ATTENDING STATEMENT MOD
This was a shared visit with the EVELINE. I reviewed and verified the documentation and independently performed the documented:

## 2023-02-06 ENCOUNTER — OUTPATIENT (OUTPATIENT)
Dept: OUTPATIENT SERVICES | Facility: HOSPITAL | Age: 58
LOS: 1 days | End: 2023-02-06
Payer: MEDICAID

## 2023-02-06 DIAGNOSIS — G50.1 ATYPICAL FACIAL PAIN: ICD-10-CM

## 2023-02-06 DIAGNOSIS — K04.6 PERIAPICAL ABSCESS WITH SINUS: ICD-10-CM

## 2023-02-06 PROCEDURE — D0140: CPT

## 2023-02-06 PROCEDURE — D0220: CPT

## 2023-02-08 ENCOUNTER — OUTPATIENT (OUTPATIENT)
Dept: OUTPATIENT SERVICES | Facility: HOSPITAL | Age: 58
LOS: 1 days | End: 2023-02-08
Payer: COMMERCIAL

## 2023-02-08 DIAGNOSIS — Z01.20 ENCOUNTER FOR DENTAL EXAMINATION AND CLEANING WITHOUT ABNORMAL FINDINGS: ICD-10-CM

## 2023-02-08 PROCEDURE — D0230: CPT

## 2023-02-08 PROCEDURE — D0330: CPT

## 2023-02-08 PROCEDURE — D1110: CPT

## 2023-02-08 PROCEDURE — D0120: CPT

## 2023-02-23 DIAGNOSIS — Z01.21 ENCOUNTER FOR DENTAL EXAMINATION AND CLEANING WITH ABNORMAL FINDINGS: ICD-10-CM

## 2023-04-11 NOTE — ED ADULT TRIAGE NOTE - STATUS:
1. Mixed anxiety and depressive disorder  Will trial Lexapro for the next few months  For increased anxiety/panic attacks given supply of lorazepam to use prn  Follow up with her primary in 3 months, or sooner if needed    - escitalopram (LEXAPRO) 10 MG tablet; Take 1 tablet (10 mg) by mouth daily for 90 days  Dispense: 30 tablet; Refill: 2  - LORazepam (ATIVAN) 1 MG tablet; Take 1 tablet (1 mg) by mouth every 8 hours as needed for anxiety  Dispense: 30 tablet; Refill: 0    2. Viral upper respiratory infection  Improving on levalbuterol, benzonatate and polytrim eye drops    3. Primary insomnia  To help with her insomnia and help stabilize her mood with better sleep will use trazodone at bedtime    - traZODone (DESYREL) 50 MG tablet; Take 1 tablet (50 mg) by mouth At Bedtime for 90 days  Dispense: 30 tablet; Refill: 2      Cruzito Cardoza is a 57 year old, presenting for the following health issues:  Depression, Anxiety (Pt here for a Depression and anxiety consult.), and Cough (Pt c/o dry cough and tightness in chest x 4-5 days.  Pt states she had a negative home covid test 4/6/23)         View : No data to display.              Anxiety  Associated symptoms include coughing and fatigue.   Cough    History of Present Illness       Mental Health Follow-up:  Patient presents to follow-up on Depression & Anxiety.Patient's depression since last visit has been:  Worse  The patient is not having other symptoms associated with depression.  Patient's anxiety since last visit has been:  Worse  The patient is having other symptoms associated with anxiety.  Any significant life events: relationship concerns and health concerns  Patient is feeling anxious or having panic attacks.  Patient has no concerns about alcohol or drug use.    Reason for visit:  Feeling very snxious/chapincito depressed but also recurrent tightness in my chest and cough (cough not as bsd as in the past)  i am using an inhaler which has helped somewhat /i'm  "getting over cold/URI/pink eye -from my 3grandchldrn    She eats 0-1 servings of fruits and vegetables daily.She consumes 1 sweetened beverage(s) daily.She exercises with enough effort to increase her heart rate 20 to 29 minutes per day.  She exercises with enough effort to increase her heart rate 3 or less days per week.   She is taking medications regularly.    Today's PHQ-9         PHQ-9 Total Score: 20    PHQ-9 Q9 Thoughts of better off dead/self-harm past 2 weeks :   Not at all    How difficult have these problems made it for you to do your work, take care of things at home, or get along with other people: Very difficult  Today's NOELLE-7 Score: 17             She has been watching her grandchildren and has been battling cough and colds all winter, she has bee using levalbuterol inhaler, tessalon, and some eye drops for conjunctivitis from her eye doctor    She has been seeing a therapist for about a year    On top of this, her sister and  in [place] got evicted and patient feels bad because she cannot help (and they are refusing help).  She is stressing over this situation and it is made worse with her cold and tightness in her chest    This past weekend (Easter) she spent most of it on the couch resting and she feels guilty about not being involved in her grandchildren this weekend    Perhaps she has been having some panic attacks    She is not sleeping well        Review of Systems   Constitutional: Positive for fatigue.   Respiratory: Positive for cough and chest tightness.    Psychiatric/Behavioral: The patient is nervous/anxious.             Objective    /80 (BP Location: Right arm, Patient Position: Sitting, Cuff Size: Adult Regular)   Pulse 80   Temp 98.9  F (37.2  C) (Tympanic)   Resp 16   Ht 1.694 m (5' 6.69\")   Wt 64.9 kg (143 lb)   SpO2 98%   BMI 22.61 kg/m    Body mass index is 22.61 kg/m .  Physical Exam  Vitals reviewed.   Constitutional:       Appearance: Normal " appearance.   HENT:      Head:      Comments: No sinus tenderness     Right Ear: Tympanic membrane normal.      Left Ear: Tympanic membrane normal.   Eyes:      Extraocular Movements: Extraocular movements intact.      Pupils: Pupils are equal, round, and reactive to light.   Cardiovascular:      Rate and Rhythm: Normal rate and regular rhythm.      Pulses: Normal pulses.      Heart sounds: Normal heart sounds.   Pulmonary:      Effort: Pulmonary effort is normal.      Breath sounds: Normal breath sounds.   Musculoskeletal:      Cervical back: Normal range of motion. No tenderness.   Lymphadenopathy:      Cervical: No cervical adenopathy.   Neurological:      Mental Status: She is alert.   Psychiatric:         Mood and Affect: Mood normal.                             Intact

## 2023-04-24 ENCOUNTER — OUTPATIENT (OUTPATIENT)
Dept: OUTPATIENT SERVICES | Facility: HOSPITAL | Age: 58
LOS: 1 days | End: 2023-04-24
Payer: COMMERCIAL

## 2023-04-24 DIAGNOSIS — K08.409 PARTIAL LOSS OF TEETH, UNSPECIFIED CAUSE, UNSPECIFIED CLASS: ICD-10-CM

## 2023-04-24 PROCEDURE — D9310: CPT

## 2023-04-25 DIAGNOSIS — K08.409 PARTIAL LOSS OF TEETH, UNSPECIFIED CAUSE, UNSPECIFIED CLASS: ICD-10-CM

## 2023-05-08 ENCOUNTER — OUTPATIENT (OUTPATIENT)
Dept: OUTPATIENT SERVICES | Facility: HOSPITAL | Age: 58
LOS: 1 days | End: 2023-05-08
Payer: COMMERCIAL

## 2023-05-08 DIAGNOSIS — K08.409 PARTIAL LOSS OF TEETH, UNSPECIFIED CAUSE, UNSPECIFIED CLASS: ICD-10-CM

## 2023-05-08 PROCEDURE — D9310: CPT

## 2023-09-29 ENCOUNTER — EMERGENCY (EMERGENCY)
Facility: HOSPITAL | Age: 58
LOS: 0 days | Discharge: HOME | End: 2023-09-30
Attending: EMERGENCY MEDICINE
Payer: MEDICAID

## 2023-09-29 VITALS
WEIGHT: 216.49 LBS | OXYGEN SATURATION: 98 % | RESPIRATION RATE: 18 BRPM | TEMPERATURE: 98 F | HEART RATE: 83 BPM | SYSTOLIC BLOOD PRESSURE: 163 MMHG | DIASTOLIC BLOOD PRESSURE: 99 MMHG

## 2023-09-29 DIAGNOSIS — R20.0 ANESTHESIA OF SKIN: ICD-10-CM

## 2023-09-29 DIAGNOSIS — M79.642 PAIN IN LEFT HAND: ICD-10-CM

## 2023-09-29 DIAGNOSIS — M19.90 UNSPECIFIED OSTEOARTHRITIS, UNSPECIFIED SITE: ICD-10-CM

## 2023-09-29 DIAGNOSIS — M79.641 PAIN IN RIGHT HAND: ICD-10-CM

## 2023-09-29 PROCEDURE — 73130 X-RAY EXAM OF HAND: CPT | Mod: 26,50

## 2023-09-29 PROCEDURE — 73130 X-RAY EXAM OF HAND: CPT | Mod: 50

## 2023-09-29 PROCEDURE — 99283 EMERGENCY DEPT VISIT LOW MDM: CPT | Mod: 25

## 2023-09-29 PROCEDURE — 99284 EMERGENCY DEPT VISIT MOD MDM: CPT

## 2023-09-29 NOTE — ED PROVIDER NOTE - PHYSICAL EXAMINATION
VITAL SIGNS: I have reviewed nursing notes and confirm.  CONSTITUTIONAL: Well-appearing, non-toxic, in NAD  SKIN: Warm dry, normal skin turgor  HEAD: NCAT  EYES: No conjunctival injection, scleral anicteric  ENT: MMM  NECK: Supple; FROM.   BACK; no midline tnedeness.   EXT: +stiffness of bilateral hand c/w arthritis w/o any weakness. No point tenderness. Normal sensation. Normal radial pulse bl.   NEURO: Grossly normal examination, CN grossly intact  PSYCH: Cooperative, appropriate

## 2023-09-29 NOTE — ED PROVIDER NOTE - CARE PROVIDER_API CALL
Som Nevarez  Orthopaedic Surgery  [address] [place] [address]  Phone: [phone]  Fax: [phone]  Follow Up Time: 1-3 Days

## 2023-09-29 NOTE — ED PROVIDER NOTE - NSFOLLOWUPINSTRUCTIONS_ED_ALL_ED_FT
Follow-up with a hand doctor (Dr Barnes) in 1-3 days regarding your visit to the ED.    [place] un seguimiento con un médico de la mano (Dr. Barnes) en 1 a 3 días con respecto a jones visita al servicio de urgencias.      Dolor de miles o mano, adulto  Hay muchas cosas que pueden causar dolor en la miles. Algunas causas comunes incluyen:    Dixie lesión en el área de la miles, viviana un esguince, dixie torcedura o dixie fractura.  Uso excesivo de la articulación.  Dixie afección que provoca un aumento de la presión sobre un nervio de la miles (síndrome del túnel carpiano).  Desgaste de las articulaciones que se produce con el envejecimiento (osteoartritis).  Dixie variedad de otros tipos de artritis.    A veces, se desconoce la causa del dolor de miles. A menudo, el dolor desaparece cuando sigue las instrucciones de jones proveedor de atención médica para aliviar el dolor en casa, viviana descansar o aplicar hielo en la miles. Si el dolor de miles continúa, es importante que se lo informe a jones proveedor de atención médica.    Sigue estas instrucciones en casa:  Descanse el área de la miles elieser al menos 48 horas o el tiempo que le indique jones proveedor de atención médica.  Si le ann aplicado dixie férula o dixie venda elástica, úsela según las indicaciones de jones proveedor de atención médica.    Retire la férula o el vendaje sólo según le indique jones proveedor de atención médica.  Afloje la férula o el vendaje si siente hormigueo en los dedos, se entumecen o se ponen fríos o azules.    ImageSi se le indica, aplique hielo en el área lesionada.    Si tiene dixie férula removible o dixie venda elástica, quítela según le indique jones proveedor de atención médica.  Joshua hielo en dixie bolsa de plástico.  Coloque dixie toalla entre jones piel y la bolsa o entre jones férula o vendaje y la bolsa.  Deje el hielo elieser 20 minutos, 2 o 3 veces al día.    Mantenga el brazo elevado (elevado) por encima del nivel del corazón mientras esté sentado o acostado.  [place] los medicamentos recetados y de venta heidy únicamente según las indicaciones de jones proveedor de atención médica.  Realice todas las visitas de seguimiento según lo indicado por jones proveedor de atención médica. [place] es importante.  Comuníquese con un proveedor de atención médica si:  Tiene un dolor repentino y marcos en la miles, la mano o el brazo que es diferente o nuevo.  La hinchazón o los hematomas en la miles o la mano empeoran.  Jones piel se enrojece, le sale sarpullido o tiene llagas abiertas.  Jones dolor no mejora o empeora.  Obtenga ayuda de inmediato si:  Pierde la sensación en los dedos o la mano.  Tus dedos se vuelven blancos, muy rojos o fríos y azules.  No puedes  los dedos.  Tiene fiebre o escalofríos.  Esta información no pretende reemplazar los consejos que le brinde s      Wrist or Hand Pain, Adult  There are many things that can cause wrist pain. Some common causes include:    An injury to the wrist area, such as a sprain, strain, or fracture.  Overuse of the joint.  A condition that causes increased pressure on a nerve in the wrist (carpal tunnel syndrome).  Wear and tear of the joints that occurs with aging (osteoarthritis).  A variety of other types of arthritis.    Sometimes, the cause of wrist pain is not known. Often, the pain goes away when you follow instructions from your health care provider for relieving pain at home, such as resting or icing the wrist. If your wrist pain continues, it is important to tell your health care provider.    Follow these instructions at home:  Rest the wrist area for at least 48 hours or as long as told by your health care provider.  If a splint or elastic bandage has been applied, use it as told by your health care provider.    Remove the splint or bandage only as told by your health care provider.  Loosen the splint or bandage if your fingers tingle, become numb, or turn cold or blue.    ImageIf directed, apply ice to the injured area.    If you have a removable splint or elastic bandage, remove it as told by your health care provider.  Put ice in a plastic bag.  Place a towel between your skin and the bag or between your splint or bandage and the bag.  Leave the ice on for 20 minutes, 2–3 times a day.    Keep your arm raised (elevated) above the level of your heart while you are sitting or lying down.  Take over-the-counter and prescription medicines only as told by your health care provider.  Keep all follow-up visits as told by your health care provider. This is important.  Contact a health care provider if:  You have a sudden sharp pain in the wrist, hand, or arm that is different or new.  The swelling or bruising on your wrist or hand gets worse.  Your skin becomes red, gets a rash, or has open sores.  Your pain does not get better or it gets worse.  Get help right away if:  You lose feeling in your fingers or hand.  Your fingers turn white, very red, or cold and blue.  You cannot move your fingers.  You have a fever or chills.  This information is not intended to replace advice given to you by your health care provider. Make sure you discuss any questions you have with your health care provider.

## 2023-09-29 NOTE — ED PROVIDER NOTE - OBJECTIVE STATEMENT
[employer]: Dolores Ellis [id number]  59yo male PMHx arthritis presenting with several weeks of gradually worsening atraumatic b/l hand pain, "pins and needles" and numbness that is worse at night. Pt works in construction. Has been told in the past he has carpal tunnel syndrome. Using tylenol and motrin with minimal relief. No neck pain or back pain or recent falls/injuries.

## 2023-09-29 NOTE — ED PROVIDER NOTE - ATTENDING CONTRIBUTION TO CARE
I personally evaluated the patient. I reviewed the Resident´s or Physician Assistant´s note (as assigned above), and agree with the findings and plan except as documented in my note.    58-year-old male presents to the emergency department for chronic onset of bilateral hand pain of uncertain etiology, thinks it is his "circulation".  Has a prior history of work-up for carpal tunnel syndrome for which he was advised to have injections but he declined several years ago.    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    GENERAL: male in no distress.   HEENT: EOMI   CHEST: normal work of breathing noted  CV: pulses intact   EXTR: FROM no bony deformities, hands minimally tender to palpation without deformity, no obvious circulation issues, cap refill normal, pulses intact.  Hands appear mildly swollen but no edema  NEURO: AAO 3 no focal deficits  SKIN: normal no pallor       Impression hand pain bilaterally likely osteoarthritis    Plan: Imaging, supportive care, NSAIDs, outpatient management.

## 2023-09-29 NOTE — ED PROVIDER NOTE - PATIENT PORTAL LINK FT
You can access the FollowMyHealth Patient Portal offered by [facility] by registering at the following website: [email or web address]. By joining [employer]’s FollowMyHealth portal, you will also be able to view your health information using other applications (apps) compatible with our system.

## 2023-09-29 NOTE — ED PROVIDER NOTE - CLINICAL SUMMARY MEDICAL DECISION MAKING FREE TEXT BOX
58-year-old male presents to the emergency department for hand pain worse over several years not improving with over-the-counter medications but no new injuries.  Works as a  in construction.  Had screening exam, no acute emergent findings, head imaging, consistent with osteoarthritis and DJD, will discharge with NSAIDs and outpatient hand follow-up.

## 2024-05-22 NOTE — ED ADULT NURSE NOTE - OBJECTIVE STATEMENT
Patient is extremely agitated at this time. Yelling at staff about the noises and not being able to sleep. See MAR. Call light within reach.   Patient presents to ED c/o b/l hand numbness and tingling. Patient reports taking omeprazole

## 2024-08-07 NOTE — SBIRT NOTE ADULT - NSSBIRTSAVECONSULT_GEN_A_CORE
Complete Continue Regimen: isotretinoin 40 mg capsule once daily Initiate Treatment: tretinoin 0.025 % topical cream to start in 1 month Render In Strict Bullet Format?: No Plan: Start sulfa Bar daily Detail Level: Zone

## 2025-06-21 ENCOUNTER — EMERGENCY (EMERGENCY)
Facility: HOSPITAL | Age: 60
LOS: 0 days | Discharge: HOME | End: 2025-06-21
Attending: EMERGENCY MEDICINE
Payer: MEDICAID

## 2025-06-21 VITALS
WEIGHT: 210.1 LBS | OXYGEN SATURATION: 97 % | HEART RATE: 78 BPM | SYSTOLIC BLOOD PRESSURE: 150 MMHG | HEIGHT: 60 IN | RESPIRATION RATE: 18 BRPM | TEMPERATURE: 98 F | DIASTOLIC BLOOD PRESSURE: 80 MMHG

## 2025-06-21 PROCEDURE — 99283 EMERGENCY DEPT VISIT LOW MDM: CPT | Mod: 25

## 2025-06-21 PROCEDURE — 96372 THER/PROPH/DIAG INJ SC/IM: CPT

## 2025-06-21 PROCEDURE — 99284 EMERGENCY DEPT VISIT MOD MDM: CPT

## 2025-06-21 RX ORDER — DEXAMETHASONE 0.5 MG/1
10 TABLET ORAL ONCE
Refills: 0 | Status: COMPLETED | OUTPATIENT
Start: 2025-06-21 | End: 2025-06-21

## 2025-06-21 RX ORDER — DIPHENHYDRAMINE HCL 12.5MG/5ML
1 ELIXIR ORAL
Qty: 15 | Refills: 0
Start: 2025-06-21 | End: 2025-06-25

## 2025-06-21 RX ORDER — EMOLLIENT COMBINATION NO.35
1 CREAM (GRAM) TOPICAL
Qty: 1 | Refills: 0
Start: 2025-06-21 | End: 2025-06-27

## 2025-06-21 RX ORDER — PREDNISONE 20 MG/1
1 TABLET ORAL
Qty: 30 | Refills: 0
Start: 2025-06-21 | End: 2025-07-05

## 2025-06-21 RX ADMIN — DEXAMETHASONE 10 MILLIGRAM(S): 0.5 TABLET ORAL at 07:59

## 2025-06-21 NOTE — ED PROVIDER NOTE - OBJECTIVE STATEMENT
59-year-old male with no past medical history presents to the ED complaining of itchy rash to bilateral arms abdomen and penis for 1 week.  Patient states was pulling weeds.  Patient has been putting calamine lotion, cortisone and taking Allegra with minimal improvement.  Patient denies any chest pain, shortness of breath, facial swelling or difficulty swallowing.

## 2025-06-21 NOTE — ED ADULT TRIAGE NOTE - CHIEF COMPLAINT QUOTE
Patient states one week ago he was doing yard work and he started getting itchy on b/l arms- patient now has rash to arms and genital area- put calamine lotion and hydrocortisone on and pt has been taking allegra with no relief

## 2025-06-21 NOTE — ED PROVIDER NOTE - PATIENT PORTAL LINK FT
You can access the FollowMyHealth Patient Portal offered by [facility] by registering at the following website: [email or web address]. By joining [employer]’s FollowMyHealth portal, you will also be able to view your health information using other applications (apps) compatible with our system.

## 2025-06-21 NOTE — ED PROVIDER NOTE - CLINICAL SUMMARY MEDICAL DECISION MAKING FREE TEXT BOX
VSS.  No distress.  Rash secondary to Toxicodendron exposure.  DC with prolonged corticosteroid taper.  Strict return instructions discussed.

## 2025-06-21 NOTE — ED PROVIDER NOTE - NSFOLLOWUPINSTRUCTIONS_ED_ALL_ED_FT
RX is filled per [facility] NP protocol    
Dermatitis por hiedra venenosa  Poison Ivy Dermatitis  La dermatitis por hiedra venenosa es el enrojecimiento y el dolor de la piel a causa de sustancias químicas en las hojas de la hiedra venenosa. Puede tener picazón muy intensa, hinchazón, dixie erupción cutánea y ampollas.    ¿Cuáles son las causas?  Tocar dixie planta de hiedra venenosa.  Tocar algo que contenga la sustancia química. [place] incluye tocar animales u objetos que hayan estado en contacto con la planta.  ¿Qué incrementa el riesgo?  Estar al aire heidy a menudo en zonas boscosas o pantanosas.  Estar al aire heidy sin ropa de protección, viviana calzado cerrado, pantalones largos y camisa de mangas largas.  ¿Cuáles son los signos o los síntomas?  A hand with red patches and a close-up of white blisters.  Enrojecimiento de la piel.  Picazón muy intensa.  Dixie erupción cutánea con protuberancias y ampollas.  Por lo general, la erupción cutánea aparece 48 horas después de la exposición, si ha tenido dixie exposición anterior.  Si es la primera exposición, es posible que la erupción cutánea no aparezca hasta dixie semana después.  Hinchazón. Puede ocurrir si la reacción es muy grave.  Por lo general, los síntomas hall de 1 a 2 semanas. La primera vez que tiene esta afección, los síntomas pueden durar entre 3 y 4 semanas.    ¿Cómo se trata?  El tratamiento de esta afección puede incluir:  Cremas con hidrocortisona o lociones con calamina para aliviar la picazón.  Anthony de joceline para calmar la piel.  Medicamentos viviana comprimidos antihistamínicos de venta heidy.  Medicamentos con corticoesteroides por vía oral para tratar reacciones muy graves.  Siga estas instrucciones en jones casa:  Medicamentos    [place] o aplique los medicamentos de venta heidy y los recetados solamente viviana se lo haya indicado el médico.  Utilice cremas con hidrocortisona o dixie loción con calamina para aliviar la picazón, en melly de que sea necesario.  Instrucciones generales    No se rasque ni se frote la piel.  Aplique un paño frío y húmedo (compresa fría) en las zonas afectadas o tome anthony de agua fría. [place] lo ayudará a aliviar la picazón.  Evite anthony y duchas calientes.  [place] anthony de joceline según sea necesario. Use joceline coloidal. Puede conseguirla en la bebo de comestibles o en la farmacia local. Siga las instrucciones del envase.  Mientras tenga erupción cutánea, lave las prendas que use inmediatamente después de sacárselas.  Revise todos los días la angeline afectada para sanjuanita si hay signos de infección. Esté atento a los siguientes signos:  Aumento del enrojecimiento, la hinchazón o el dolor.  Líquido o georgie.  Calor.  Pus o mal olor.  Concurra a todas las visitas de seguimiento. Es posible que jones médico desee sanjuanita cómo está jones piel con el tratamiento.  ¿Cómo se previene?    Poison ivy leaves.  Aprenda a reconocer la hiedra venenosa, para poder evitarla.  Esta planta tiene jerrica hojas, con ramas que florecen de un solo tallo.  Las hojas son brillantes.  Las hojas tienen bordes irregulares que terminan en dixie punta.  Si toca dixie hiedra venenosa, lávese la piel con agua y jabón de inmediato. Asegúrese de lavarse debajo de las uñas de las dottie.  Cuando smooth excursiones o vaya de campamento, use pantalones largos, camisa de mangas largas, medias largas y botas para caminar. También puede colocarse dixie loción en la piel que ayuda a evitar el contacto con la hiedra venenosa.  Si leonor que jones ropa o el equipo específico para el aire heidy entraron en contacto con dixie hiedra venenosa, enjuáguelos con dixie manguera de jardín antes de llevarlos a jones casa.  Cuando trabaje en el jardín o smooth jardinería, use guantes, mangas largas, pantalones largos y botas. Lave las herramientas de jardín y los guantes si están en contacto con la hiedra venenosa.  Si leonor que jones mascota ha entrado en contacto con la hiedra venenosa, lave al animal con champú para mascotas y agua. Asegúrese de usar guantes mientras lava a la mascota.  Comuníquese con un médico si:  Tiene úlceras abiertas en la angeline de la erupción cutánea.  Tiene signos de infección.  Tiene enrojecimiento que se extiende más allá de la angeline de la erupción cutánea.  Tiene fiebre.  Tiene dixie erupción cutánea en dixie angeline extensa del cuerpo.  Tiene dixie erupción cutánea en los ojos, la boca o los genitales.  La erupción cutánea no mejora después de unas semanas.  Solicite ayuda de inmediato si:  Se le hincha la hope o se le hinchan los párpados al punto de cerrarse.  Tiene dificultad para respirar.  Tiene dificultad para tragar.  Estos síntomas pueden indicar dixie emergencia. No espere a sanjuanita si los síntomas desaparecen. Solicite ayuda de inmediato. Llame al 911.    Esta información no tiene viviana fin reemplazar el consejo del médico. Asegúrese de hacerle al médico cualquier pregunta que tenga.